# Patient Record
Sex: MALE | Race: ASIAN | NOT HISPANIC OR LATINO | URBAN - METROPOLITAN AREA
[De-identification: names, ages, dates, MRNs, and addresses within clinical notes are randomized per-mention and may not be internally consistent; named-entity substitution may affect disease eponyms.]

---

## 2022-03-23 ENCOUNTER — INPATIENT (INPATIENT)
Facility: HOSPITAL | Age: 67
LOS: 0 days | Discharge: ANOTHER HOSPITAL | DRG: 563 | End: 2022-03-24
Attending: INTERNAL MEDICINE | Admitting: INTERNAL MEDICINE
Payer: COMMERCIAL

## 2022-03-23 VITALS
DIASTOLIC BLOOD PRESSURE: 87 MMHG | OXYGEN SATURATION: 97 % | HEIGHT: 67 IN | RESPIRATION RATE: 18 BRPM | HEART RATE: 90 BPM | TEMPERATURE: 98 F | WEIGHT: 125 LBS | SYSTOLIC BLOOD PRESSURE: 161 MMHG

## 2022-03-23 DIAGNOSIS — Z29.9 ENCOUNTER FOR PROPHYLACTIC MEASURES, UNSPECIFIED: ICD-10-CM

## 2022-03-23 DIAGNOSIS — S92.002A UNSPECIFIED FRACTURE OF LEFT CALCANEUS, INITIAL ENCOUNTER FOR CLOSED FRACTURE: ICD-10-CM

## 2022-03-23 DIAGNOSIS — R94.31 ABNORMAL ELECTROCARDIOGRAM [ECG] [EKG]: ICD-10-CM

## 2022-03-23 DIAGNOSIS — I10 ESSENTIAL (PRIMARY) HYPERTENSION: ICD-10-CM

## 2022-03-23 DIAGNOSIS — D72.829 ELEVATED WHITE BLOOD CELL COUNT, UNSPECIFIED: ICD-10-CM

## 2022-03-23 DIAGNOSIS — E78.5 HYPERLIPIDEMIA, UNSPECIFIED: ICD-10-CM

## 2022-03-23 LAB
ALBUMIN SERPL ELPH-MCNC: 3.6 G/DL — SIGNIFICANT CHANGE UP (ref 3.3–5)
ALP SERPL-CCNC: 110 U/L — SIGNIFICANT CHANGE UP (ref 40–120)
ALT FLD-CCNC: 28 U/L — SIGNIFICANT CHANGE UP (ref 12–78)
ANION GAP SERPL CALC-SCNC: 6 MMOL/L — SIGNIFICANT CHANGE UP (ref 5–17)
APTT BLD: 33.9 SEC — SIGNIFICANT CHANGE UP (ref 27.5–35.5)
AST SERPL-CCNC: 25 U/L — SIGNIFICANT CHANGE UP (ref 15–37)
BASOPHILS # BLD AUTO: 0.06 K/UL — SIGNIFICANT CHANGE UP (ref 0–0.2)
BASOPHILS NFR BLD AUTO: 0.5 % — SIGNIFICANT CHANGE UP (ref 0–2)
BILIRUB SERPL-MCNC: 0.6 MG/DL — SIGNIFICANT CHANGE UP (ref 0.2–1.2)
BUN SERPL-MCNC: 18 MG/DL — SIGNIFICANT CHANGE UP (ref 7–23)
CALCIUM SERPL-MCNC: 9 MG/DL — SIGNIFICANT CHANGE UP (ref 8.5–10.1)
CHLORIDE SERPL-SCNC: 106 MMOL/L — SIGNIFICANT CHANGE UP (ref 96–108)
CO2 SERPL-SCNC: 27 MMOL/L — SIGNIFICANT CHANGE UP (ref 22–31)
CREAT SERPL-MCNC: 0.97 MG/DL — SIGNIFICANT CHANGE UP (ref 0.5–1.3)
EGFR: 87 ML/MIN/1.73M2 — SIGNIFICANT CHANGE UP
EOSINOPHIL # BLD AUTO: 0.27 K/UL — SIGNIFICANT CHANGE UP (ref 0–0.5)
EOSINOPHIL NFR BLD AUTO: 2.4 % — SIGNIFICANT CHANGE UP (ref 0–6)
GLUCOSE SERPL-MCNC: 101 MG/DL — HIGH (ref 70–99)
HCT VFR BLD CALC: 41 % — SIGNIFICANT CHANGE UP (ref 39–50)
HGB BLD-MCNC: 14.2 G/DL — SIGNIFICANT CHANGE UP (ref 13–17)
IMM GRANULOCYTES NFR BLD AUTO: 0.4 % — SIGNIFICANT CHANGE UP (ref 0–1.5)
INR BLD: 1.08 RATIO — SIGNIFICANT CHANGE UP (ref 0.88–1.16)
LYMPHOCYTES # BLD AUTO: 2.42 K/UL — SIGNIFICANT CHANGE UP (ref 1–3.3)
LYMPHOCYTES # BLD AUTO: 21.5 % — SIGNIFICANT CHANGE UP (ref 13–44)
MCHC RBC-ENTMCNC: 29.3 PG — SIGNIFICANT CHANGE UP (ref 27–34)
MCHC RBC-ENTMCNC: 34.6 GM/DL — SIGNIFICANT CHANGE UP (ref 32–36)
MCV RBC AUTO: 84.5 FL — SIGNIFICANT CHANGE UP (ref 80–100)
MONOCYTES # BLD AUTO: 0.94 K/UL — HIGH (ref 0–0.9)
MONOCYTES NFR BLD AUTO: 8.3 % — SIGNIFICANT CHANGE UP (ref 2–14)
NEUTROPHILS # BLD AUTO: 7.55 K/UL — HIGH (ref 1.8–7.4)
NEUTROPHILS NFR BLD AUTO: 66.9 % — SIGNIFICANT CHANGE UP (ref 43–77)
NRBC # BLD: 0 /100 WBCS — SIGNIFICANT CHANGE UP (ref 0–0)
PLATELET # BLD AUTO: 355 K/UL — SIGNIFICANT CHANGE UP (ref 150–400)
POTASSIUM SERPL-MCNC: 3.9 MMOL/L — SIGNIFICANT CHANGE UP (ref 3.5–5.3)
POTASSIUM SERPL-SCNC: 3.9 MMOL/L — SIGNIFICANT CHANGE UP (ref 3.5–5.3)
PROT SERPL-MCNC: 7.7 G/DL — SIGNIFICANT CHANGE UP (ref 6–8.3)
PROTHROM AB SERPL-ACNC: 12.6 SEC — SIGNIFICANT CHANGE UP (ref 10.5–13.4)
RBC # BLD: 4.85 M/UL — SIGNIFICANT CHANGE UP (ref 4.2–5.8)
RBC # FLD: 13.8 % — SIGNIFICANT CHANGE UP (ref 10.3–14.5)
SARS-COV-2 RNA SPEC QL NAA+PROBE: SIGNIFICANT CHANGE UP
SODIUM SERPL-SCNC: 139 MMOL/L — SIGNIFICANT CHANGE UP (ref 135–145)
WBC # BLD: 11.28 K/UL — HIGH (ref 3.8–10.5)
WBC # FLD AUTO: 11.28 K/UL — HIGH (ref 3.8–10.5)

## 2022-03-23 PROCEDURE — 73700 CT LOWER EXTREMITY W/O DYE: CPT | Mod: 26,LT,MA

## 2022-03-23 PROCEDURE — 73590 X-RAY EXAM OF LOWER LEG: CPT | Mod: 26,LT

## 2022-03-23 PROCEDURE — 99285 EMERGENCY DEPT VISIT HI MDM: CPT

## 2022-03-23 PROCEDURE — 72100 X-RAY EXAM L-S SPINE 2/3 VWS: CPT | Mod: 26

## 2022-03-23 PROCEDURE — 73610 X-RAY EXAM OF ANKLE: CPT | Mod: 26,LT

## 2022-03-23 PROCEDURE — 93010 ELECTROCARDIOGRAM REPORT: CPT

## 2022-03-23 PROCEDURE — 71045 X-RAY EXAM CHEST 1 VIEW: CPT | Mod: 26

## 2022-03-23 PROCEDURE — 73630 X-RAY EXAM OF FOOT: CPT | Mod: 26,LT

## 2022-03-23 PROCEDURE — 76376 3D RENDER W/INTRP POSTPROCES: CPT | Mod: 26

## 2022-03-23 RX ORDER — AMLODIPINE BESYLATE 2.5 MG/1
5 TABLET ORAL DAILY
Refills: 0 | Status: DISCONTINUED | OUTPATIENT
Start: 2022-03-23 | End: 2022-03-23

## 2022-03-23 RX ORDER — TRAMADOL HYDROCHLORIDE 50 MG/1
25 TABLET ORAL EVERY 6 HOURS
Refills: 0 | Status: DISCONTINUED | OUTPATIENT
Start: 2022-03-23 | End: 2022-03-24

## 2022-03-23 RX ORDER — SODIUM CHLORIDE 9 MG/ML
1000 INJECTION, SOLUTION INTRAVENOUS
Refills: 0 | Status: DISCONTINUED | OUTPATIENT
Start: 2022-03-24 | End: 2022-03-24

## 2022-03-23 RX ORDER — METOPROLOL TARTRATE 50 MG
50 TABLET ORAL DAILY
Refills: 0 | Status: DISCONTINUED | OUTPATIENT
Start: 2022-03-23 | End: 2022-03-24

## 2022-03-23 RX ORDER — LOSARTAN POTASSIUM 100 MG/1
100 TABLET, FILM COATED ORAL DAILY
Refills: 0 | Status: DISCONTINUED | OUTPATIENT
Start: 2022-03-23 | End: 2022-03-24

## 2022-03-23 RX ORDER — ASPIRIN/CALCIUM CARB/MAGNESIUM 324 MG
81 TABLET ORAL DAILY
Refills: 0 | Status: DISCONTINUED | OUTPATIENT
Start: 2022-03-23 | End: 2022-03-23

## 2022-03-23 RX ORDER — ATORVASTATIN CALCIUM 80 MG/1
20 TABLET, FILM COATED ORAL AT BEDTIME
Refills: 0 | Status: DISCONTINUED | OUTPATIENT
Start: 2022-03-23 | End: 2022-03-24

## 2022-03-23 RX ORDER — AMLODIPINE BESYLATE 2.5 MG/1
5 TABLET ORAL DAILY
Refills: 0 | Status: DISCONTINUED | OUTPATIENT
Start: 2022-03-23 | End: 2022-03-24

## 2022-03-23 RX ORDER — ACETAMINOPHEN 500 MG
650 TABLET ORAL EVERY 6 HOURS
Refills: 0 | Status: DISCONTINUED | OUTPATIENT
Start: 2022-03-23 | End: 2022-03-24

## 2022-03-23 RX ORDER — LANOLIN ALCOHOL/MO/W.PET/CERES
3 CREAM (GRAM) TOPICAL AT BEDTIME
Refills: 0 | Status: DISCONTINUED | OUTPATIENT
Start: 2022-03-23 | End: 2022-03-24

## 2022-03-23 RX ORDER — ONDANSETRON 8 MG/1
4 TABLET, FILM COATED ORAL EVERY 8 HOURS
Refills: 0 | Status: DISCONTINUED | OUTPATIENT
Start: 2022-03-23 | End: 2022-03-24

## 2022-03-23 RX ORDER — TRAMADOL HYDROCHLORIDE 50 MG/1
50 TABLET ORAL EVERY 6 HOURS
Refills: 0 | Status: DISCONTINUED | OUTPATIENT
Start: 2022-03-23 | End: 2022-03-24

## 2022-03-23 RX ADMIN — ATORVASTATIN CALCIUM 20 MILLIGRAM(S): 80 TABLET, FILM COATED ORAL at 21:21

## 2022-03-23 NOTE — H&P ADULT - EXTREMITIES COMMENTS
Left foot w/ mild non-pitting edema, mildly erythematous when compared to right Left foot w/ mild non-pitting edema, mildly erythematous when compared to right foot

## 2022-03-23 NOTE — H&P ADULT - NSHPSOCIALHISTORY_GEN_ALL_CORE
lives with   etoh  tobacco  drugs  ambulates  adls lives with wife and two sons  etoh- occasionally   tobacco- current everyday smoker, smokes a pack per day, 40 pack year history   drugs - none  ambulates - independently before injury, now ambulates w/ crutches   adls independent

## 2022-03-23 NOTE — H&P ADULT - PROBLEM SELECTOR PLAN 2
BP acceptable on admission, mildly elevated likely 2/2 to pain   continue acute, likely 2/2 to inflammation/stress associated w/ calcaneal fracture   - admission WBC 11.28   - patient without fever or symptoms of infection   - will monitor off abx for now   - trend daily CBCs Pt with abnormal EKG  -? cardiac history ? Unclear History of CAD  -On Tele  Cardiology [facility]  On ASA,  Statin, BB Pt with abnormal EKG  -? cardiac history ? Unclear History of CAD  -On Tele  Cardiology DR [facility]  On ASA,  Statin, BB,  ECHO-as per Cardio Pt with abnormal EKG  -? cardiac history ? MI  ? Unclear History of CAD  -On Tele  Cardiology DR [facility]  On ASA,  Statin, BB,  ECHO-as per Cardio

## 2022-03-23 NOTE — H&P ADULT - PROBLEM SELECTOR PLAN 1
- CT Left foot: Severely comminuted calcaneal fracture with significant impaction s/p known fall   - pain control with:   - podiatry consulted, plan for left calceneal fracture repair  - cardio aleja consulted for clearance - CT Left foot: Severely comminuted calcaneal fracture with significant impaction s/p known fall   - pain control with:   - podiatry consulted, plan for left calcaneal fracture repair  - cardio aleja consulted for clearance  - NPO after midnight  - will hold anticoagulants for now, reached out to podiatry to confirm time of procedure - CT Left foot: Severely comminuted calcaneal fracture with significant impaction s/p known fall   - pain control with: tylenol 650mg q6H PRN  - podiatry consulted, plan for left calcaneal fracture repair tomorrow, NPO after midnight   - cardio aleja consulted for clearance  - will hold patients home ASA 81 for now - CT Left foot: Severely comminuted calcaneal fracture with significant impaction s/p known fall   - pain control with: tylenol 650mg q6H PRN  - podiatry consulted, plan for left calcaneal fracture repair tomorrow  - NPO after midnight, IVF NS + D5 @ 60 cc/hr after NPO   - Pain regimen: tylenol 650mg for mild, ultram 25mg q6H for moderate, ultram 50mg q6H for severe   - cardio aleja consulted for clearance  - will hold patients home ASA 81 for now - CT Left foot: Severely comminuted calcaneal fracture with significant impaction s/p known fall   - pain control with: tylenols 650mg q6H PRN  - podiatry consulted, plan for left calcaneal fracture repair tomorrow  - NPO after midnight, IVF NS + D5 @ 60 cc/hr after NPO   - Pain regimen: Tylenol 650mg for mild, ultram 25mg q6H for moderate, ultram 50mg q6H for severe   - cardio aleja consulted for clearance  - will hold patients home ASA 81 for now

## 2022-03-23 NOTE — ED ADULT NURSE NOTE - OBJECTIVE STATEMENT
Patient A/Ox4. Son at bedside to translate per patient preference. Patient fell off "4-5 steps" of a ladder and landed on his heel 1 week ago. Patient did not seek any medical care at that time. Today, he c/o left ankle pain, ecchymosis and swelling. + pulse. Denies head strike. Patient declined ice pack at this time. Will continue to monitor. Patient A/Ox4. Son at bedside to translate per patient preference. Patient is Mandarin speaking. Patient fell off "4-5 steps" of a ladder and landed on his heel 1 week ago. Patient did not seek any medical care at that time. Today, he c/o left ankle pain, ecchymosis and swelling. + pulse. Denies head strike. Patient declined ice pack at this time. Will continue to monitor.

## 2022-03-23 NOTE — CONSULT NOTE ADULT - PROBLEM SELECTOR RECOMMENDATION 9
INCOMPLETE    Pt seen and evaluated.  XR results reviewed- left calcaneal joint-depression type fracture.  Pending calcaneal axial XR and CT to assess degree of comminution.  Pending lumbar XR to r/o fracture.  ~  Elevate and ice left lower extremity.  Non-weightbearing to the left lower extremity with crutches.  Keep splint clean, dry and intact until follow-up.  Follow up with Dr. Avila ________________. normal... INCOMPLETE    Pt seen and evaluated.  XR results reviewed- left calcaneal joint-depression type fracture.  Pending calcaneal axial XR and CT to assess degree of comminution and for possible surgical planning.  Pending lumbar XR to r/o fracture.  ~  Elevate and ice left lower extremity.  Non-weightbearing to the left lower extremity with crutches.  Keep splint clean, dry and intact until follow-up.  Follow up with Dr. Mitchell next Friday 4/1/22. INCOMPLETE    Pt seen and evaluated.  XR results reviewed- left calcaneal joint-depression type fracture.  Pending calcaneal axial XR and CT to assess degree of comminution and for possible surgical planning.  Pending lumbar XR to r/o fracture. Pt seen and evaluated.  Left calcaneal comminuted fracture.  XR and CT imaging results reviewed.  Plan for left calcaneus ORIF tomorrow 3/24/22 as an add on.  Requesting medical risk stratification and optimization  Requesting cardiology risk stratification and optimization  Please obtain pre-op labs (CBC, BMP, type & screen, coags on AM of surgery)  Please obtain chest XR (2 views) and EKG  Please keep pt NPO at midnight prior to surgery and start IV fluids. Pt seen and evaluated.  XR and CT imaging results reviewed.  Left calcaneal comminuted fracture.  Discussed both conservative and surgical options with the pt, as well as all risks and benefits.  Conservative intervention would consist of 8-12 weeks of non-weightbearing in a splint or cast, however due to the extent of comminution and subtalar joint involvement, there is high likelihood of post-traumatic arthritis, continued pain and foot deformity.  Surgical intervention would consist of open reduction and internal fixation most likely using a plate and screws, with the possibility of subtalar joint fusion depending on how the fracture looks intra-op. Post-op course consists of approx 8 weeks of non-weightbearing depending on serial x-rays and clinical course. Transition to non-weightbearing in a CAM boot, and then gradual transition to weight bearing.  Given the patient's severity of symptoms, will plan for surgical intervention at this time. Discussed risks, benefits, and potential complications with patient and family members regarding surgical intervention including sepsis, loss of limb, and loss of life. All questions answered to satisfaction at this time.  ~  Plan for left calcaneus ORIF tomorrow 3/24/22 as an add on.  Requesting medical risk stratification and optimization  Requesting cardiology risk stratification and optimization  Please obtain pre-op labs (CBC, BMP, type & screen, coags on AM of surgery)  Please obtain chest XR (2 views) and EKG  Please keep pt NPO at midnight prior to surgery and start IV fluids.  Pt will be followed by Podiatry while in house. Pt seen and evaluated.  XR and CT imaging results reviewed.  Left calcaneal comminuted fracture.  Discussed both conservative and surgical options with the pt, as well as all risks and benefits.  Conservative intervention would consist of 8-12 weeks of non-weightbearing in a splint or cast, however due to the extent of comminution and subtalar joint involvement, there is high likelihood of post-traumatic arthritis, continued pain and foot deformity.  Surgical intervention would consist of open reduction and internal fixation most likely using a plate and screws, with the possibility of subtalar joint fusion depending on how the fracture looks intra-op. Post-op course consists of approx 8 weeks of non-weightbearing depending on serial x-rays and clinical course. Transition to non-weightbearing in a CAM boot, and then gradual transition to weight bearing.  Pt understands that if ORIF if performed, there is a possibility that he would require revisional surgery in the future.  Given the patient's severity of symptoms, will plan for surgical intervention at this time. Discussed risks, benefits, and potential complications with patient and family members regarding surgical intervention including sepsis, loss of limb, and loss of life. All questions answered to satisfaction at this time.  ~  Plan for left calcaneus ORIF tomorrow 3/24/22 as an add on.  Requesting medical risk stratification and optimization  Requesting cardiology risk stratification and optimization  Please obtain pre-op labs (CBC, BMP, type & screen, coags on AM of surgery)  Please obtain chest XR (2 views) and EKG  Please keep pt NPO at midnight prior to surgery and start IV fluids.  Pt will be followed by Podiatry while in house. Pt seen and evaluated.  XR and CT imaging results reviewed.  Left calcaneal comminuted fracture.  Discussed both conservative and surgical options with the pt, as well as all risks and benefits.  Conservative intervention would consist of 8-12 weeks of non-weightbearing in a splint or cast, however due to the extent of comminution and subtalar joint involvement, there is high likelihood of post-traumatic arthritis, continued pain and foot deformity.  Surgical intervention would consist of open reduction and internal fixation most likely using a plate and screws. Post-op course consists of approx 8 weeks of non-weightbearing depending on serial x-rays and clinical course. Transition to non-weightbearing in a CAM boot, and then gradual transition to weight bearing.  Pt understands that if ORIF if performed, there is a possibility that he would require revisional surgery in the future.  Instructed pt on smoking cessation as it can lead to delayed healing and wound breakdown.  Given the patient's severity of symptoms, will plan for surgical intervention at this time. Discussed risks, benefits, and potential complications with patient and family members regarding surgical intervention including sepsis, loss of limb, and loss of life. All questions answered to satisfaction at this time.  ~  Plan for left calcaneus ORIF tomorrow 3/24/22 as an add on.  Requesting medical risk stratification and optimization  Requesting cardiology risk stratification and optimization  Please obtain pre-op labs (CBC, BMP, type & screen, coags on AM of surgery)  Please obtain chest XR (2 views) and EKG  Please keep pt NPO at midnight prior to surgery and start IV fluids.  Pt will be followed by Podiatry while in house.

## 2022-03-23 NOTE — H&P ADULT - PROBLEM SELECTOR PLAN 4
Chronic, stable   continue home atorvastatin 20mg qD Chronic, BP acceptable on admission, mildly elevated likely 2/2 to pain   continue home irbesartan therapeutic interchange losartan 100mg qD  continue home metoprolol succinate 50mg qD  routine hemodynamic monitoring

## 2022-03-23 NOTE — PATIENT PROFILE ADULT - FALL HARM RISK - HARM RISK INTERVENTIONS

## 2022-03-23 NOTE — H&P ADULT - ATTENDING COMMENTS
65 yo M PMHx HTN, HLD, PVCs presenting to ED w/ left foot pain s/p mechanical fall 1 week ago, admitted for left calcaneal fracture repair.   pt seen, examined, case & care plan d/w pt, residents at detail.  D/W Son at detail.  AM labs   NPO after midnight for Possible Left calcaneal fracture repair.  Pain meds PRN. NON weight bearing Rt foot.  -Pre Op IV Antibiotics as per Podiatry.  Post op DVT prophylaxis. 65 yo M PMHx HTN, HLD, PVCs presenting to ED w/ left foot pain s/p mechanical fall 1 week ago, admitted for left calcaneal fracture repair.   pt seen, examined, case & care plan d/w pt, residents at detail.  D/W Son at detail.  AM labs   NPO after midnight for Possible Left calcaneal fracture repair.  Pain meds PRN. NON weight bearing left foot.  -Pre Op IV Antibiotics as per Podiatry.  Post op DVT prophylaxis.  Cardiology Eval for abnormal EKG & Pre Op Eval.  D/W pt & Son at detail.

## 2022-03-23 NOTE — H&P ADULT - PROBLEM SELECTOR PLAN 6
Linear
will place SCD's pending possible procedure  start chemical prophylaxis when surgically appropriate

## 2022-03-23 NOTE — ED PROVIDER NOTE - PHYSICAL EXAMINATION
ms left le:+ ecchymosis left ankle with diffuse swelling diffusely ttp. calcaneous and tarsal bones ttp. digits nontender decreased rom sensation intact NVI + 2 DP

## 2022-03-23 NOTE — H&P ADULT - PROBLEM SELECTOR PLAN 5
will place SCD's pending possible procedure  start chemical prophylaxis when surgically appropriate Chronic, stable   continue home atorvastatin 20mg qD

## 2022-03-23 NOTE — ED PROVIDER NOTE - CLINICAL SUMMARY MEDICAL DECISION MAKING FREE TEXT BOX
Pt presenting to the ED with left foot/ankle pain and significant swelling s/p fall from ladder. NVI at this time and despite swelling compartments are soft. High suspicion for fracture. Will obtain screening x-rays. Pt refusing pain medication

## 2022-03-23 NOTE — ED PROVIDER NOTE - NSICDXPASTMEDICALHX_GEN_ALL_CORE_FT
PAST MEDICAL HISTORY:  HTN (hypertension)      PAST MEDICAL HISTORY:  HLD (hyperlipidemia)     HTN (hypertension)     PVC (premature ventricular contraction)

## 2022-03-23 NOTE — H&P ADULT - MUSCULOSKELETAL
normal/no joint swelling/no calf tenderness/normal strength detailed exam details… normal/no joint swelling/no calf tenderness/normal strength/decreased ROM/decreased ROM due to pain/joint swelling/joint erythema

## 2022-03-23 NOTE — H&P ADULT - NSICDXPASTMEDICALHX_GEN_ALL_CORE_FT
PAST MEDICAL HISTORY:  HLD (hyperlipidemia)     HTN (hypertension)     PVC (premature ventricular contraction)

## 2022-03-23 NOTE — H&P ADULT - NEGATIVE OPHTHALMOLOGIC SYMPTOMS
no blurred vision L/no blurred vision R/no discharge L/no discharge R/no pain L/no pain R/no scleral injection L/no scleral injection R

## 2022-03-23 NOTE — CONSULT NOTE ADULT - SUBJECTIVE AND OBJECTIVE BOX
HPI:  66 year old Male with PMHx of hypertension seen at \[facility]\"" ED for left foot injury. Pt fell from a ladder about 2ft off the ground 1 week ago (DOI: 3/16/22). They applied ice to the area, which helped reduce the swelling, however he states that even though the pain has decreased, it has not completely gone away. Presented to the ED as the symptoms did not improve and he has been unable to bear any weight on the foot. Pain is a 3/10 at this time. Did not seek medical attention at the time of injury. Denies any fever, chills, nausea, vomiting, chest pain, shortness of breath, or calf pain at this time.    PAST MEDICAL HISTORY: Hypertension      Allergies    No Known Allergies    Intolerances      Social History:      Vital Signs Last 24 Hrs  T(C): 36.6 (23 Mar 2022 12:44), Max: 36.6 (23 Mar 2022 12:44)  T(F): 97.9 (23 Mar 2022 12:44), Max: 97.9 (23 Mar 2022 12:44)  HR: 90 (23 Mar 2022 12:44) (90 - 90)  BP: 161/87 (23 Mar 2022 12:44) (161/87 - 161/87)  BP(mean): --  RR: 18 (23 Mar 2022 12:44) (18 - 18)  SpO2: 97% (23 Mar 2022 12:44) (97% - 97%)    PHYSICAL EXAM:  Vascular: DP & PT palpable bilaterally, Capillary refill 3 seconds, Digital hair present bilaterally, left medial and lateral heel border ecchymosis, left foot non-pitting edema.  Neurological: Light touch sensation intact bilaterally.  Musculoskeletal: AJ & STJ ROM intact, pain on palpation to anterior lateral ankle, no pain on lateral compression of the left heel.  Dermatological: No open wounds or lesions b/l. No fracture blisters.   HPI:  66 year old Male with PMHx of hypertension seen at hospitals ED for left foot injury. Pt fell from a ladder about 2ft off the ground 1 week ago (DOI: 3/16/22). They applied ice to the area, which helped reduce the swelling, however he states that even though the pain has decreased, it has not completely gone away. Presented to the ED as the symptoms did not improve and he has been unable to bear any weight on the foot. Pain is a 3/10 at this time. Did not seek medical attention at the time of injury. Denies any fever, chills, nausea, vomiting, chest pain, shortness of breath, or calf pain at this time.    PAST MEDICAL HISTORY:  HLD (hyperlipidemia)     HTN (hypertension)     PVC (premature ventricular contraction).     PAST SURGICAL HISTORY:  No significant past surgical history.     FAMILY HISTORY:  Father  Still living? Unknown  FH: HTN (hypertension), Age at diagnosis: Age Unknown.    Allergies    No Known Allergies    Intolerances      Social History: lives with wife and two sons  etoh- occasionally   tobacco- current everyday smoker, smokes a pack per day, 40 pack year history   drugs - none  ambulates - independently before injury, now ambulates w/ crutches   adls independent      Vital Signs Last 24 Hrs  T(C): 36.6 (23 Mar 2022 12:44), Max: 36.6 (23 Mar 2022 12:44)  T(F): 97.9 (23 Mar 2022 12:44), Max: 97.9 (23 Mar 2022 12:44)  HR: 90 (23 Mar 2022 12:44) (90 - 90)  BP: 161/87 (23 Mar 2022 12:44) (161/87 - 161/87)  BP(mean): --  RR: 18 (23 Mar 2022 12:44) (18 - 18)  SpO2: 97% (23 Mar 2022 12:44) (97% - 97%)    PHYSICAL EXAM:  Vascular: DP & PT palpable bilaterally, Capillary refill 3 seconds, Digital hair present bilaterally, left medial and lateral heel border ecchymosis, left foot non-pitting edema.  Neurological: Light touch sensation intact bilaterally.  Musculoskeletal: AJ & STJ ROM intact, pain on palpation to anterior lateral ankle, no pain on lateral compression of the left heel.  Dermatological: No open wounds or lesions b/l. No fracture blisters.

## 2022-03-23 NOTE — ED PROVIDER NOTE - OBJECTIVE STATEMENT
pt Is a 67yo male with pmhx of htn, "heart issues" on AC presents with left ankle/foot pain. pt reports he fell a few days ago without head injury. pt reports swelling and deformity to left ankle. pt has been using crutches to assist in ambulating. pt did not take anything for pain. pt denies numbness, head injury, loc. son awais used as  per pt request. offered interpretation services.

## 2022-03-23 NOTE — H&P ADULT - ASSESSMENT
67 yo M PMHx HTN admitted for left calceneal fracture repair.  65 yo M PMHx HTN, HLD, PVCs presenting to ED w/ left foot pain s/p mechanical fall 1 week ago, admitted for left calceneal fracture repair.

## 2022-03-23 NOTE — H&P ADULT - PROBLEM SELECTOR PLAN 3
will place SCD's pending possible procedure  start chemical prophylaxis when surgically appropiate Chronic, BP acceptable on admission, mildly elevated likely 2/2 to pain   continue home irbesartan therapeutic interchange losartan 100mg qD  continue home metoprolol succinate 50mg qD  routine hemodynamic monitoring acute, reactive , likely 2/2 to inflammation/stress associated w/ left calcaneal fracture   - admission WBC 11.28   - patient without fever or symptoms of infection   - will monitor off abx for now   - trend daily CBCs

## 2022-03-23 NOTE — ED PROVIDER NOTE - ATTENDING CONTRIBUTION TO CARE
pt Is a 65yo male with pmhx of htn, "heart issues" on AC presents with left ankle/foot pain. pt reports he fell a few days ago without head injury. pt reports swelling and deformity to left ankle. pt has been using crutches to assist in ambulating. pt did not take anything for pain. pt denies numbness, head injury, loc. son awais used as  per pt request. offered interpretation services. Pt is a 67 yo male who presents to the ED with a cc of left foot pain. PMHx of HTN. Pt reports that appro 1 week ao he feel from a ladder landing on his left foot. Pt reports that the fall was approx 2 feet. Denies head injury. Reports pain to his left heel with worsening swelling to the left foot and inability to bear weight to th left foot. Symptoms have worsened and so pt came to the ED for further work up. Denies fever, chills, N/V. CP, SOB, abd pain. Pt has not taken anything for pain. On exam pt sitting in bed NAD, heart RR, lungs CTA, abd soft NT/ND. Left foot: diffuse swelling to left foot/ankle with TTP overlying the calcaneus. Significant swelling noted to left foot/ankle. Sensation grossly intact cap refill less then 2 seconds +pedal pulse. No TTP to left hip, femur, knee, tib/fib. No midline TTP to C/T/L

## 2022-03-23 NOTE — ED PROVIDER NOTE - NS ED ATTENDING STATEMENT MOD
This was a shared visit with the [facility]. I reviewed and verified the documentation and independently performed the documented:

## 2022-03-23 NOTE — ED PROVIDER NOTE - PROGRESS NOTE DETAILS
podiatrist advised admission and surgery pt deciding pending dispo podiatrist consulted pending eval pt refuses pain control still pending dispo pt waiting for podiatrist in ed for re-eval pt still refuses pain control will admit to dr celeste

## 2022-03-23 NOTE — H&P ADULT - HISTORY OF PRESENT ILLNESS
67 yo M PMHx HTN    here for fall.           ER Course:   Vitals: 161/87 HR 90/minute RR 18/minute T 97.9 F 97% RA   Labs: insignificant   EKG:   Imaging:   CT Left Foot: Severely comminuted calcaneal fracture with significant impaction and comminution of the central to lateral margin of the posterior articular   facet. There is slightly displaced calcaneal fracture involvement of the   calcaneocuboid joint and nondisplaced fracture involvement of the middle   calcaneal facet.  X ray Left Foot: Comminuted, minimally displaced calcaneal fracture, CT ordered   65 yo M PMHx HTN, HLD, occasional PVCs presented to the ED complaining of left ankle pain. Patient is mandarin speaking, refused telephone  services, preferred to be communicated w/ using son Yair Rodarte as . Patient was doing house work on a ladder 1 week ago when the ladder slipped and patient fell about 2-4 feet. Patient landed on his feet and sat down immediately after, denied any head strike, or loss of consciousness. Patient states since that fall, his left ankle/foot has been in pain, forcing him to ambulate using crutches to prevent bearing weight. Patient has been just treating the pain with chinese medicines and aspirin at home, but states he wanted to come to the ED when the pain did not resolve after a week. Patient seen and examined in the ED, denies any headache/lightheadedness/dizziness, CP, chest pressure, palpitations, abd pain, nausea/vomitting, diarrhea/constipation, fevers/chills. Patient only endorses left ankle/foot pain, achey in nature 3/10 non-radiating.     ER Course:   Vitals: 161/87 HR 90/minute RR 18/minute T 97.9 F 97% RA   Labs: insignificant   EKG:   Imaging:   CT Left Foot: Severely comminuted calcaneal fracture with significant impaction and comminution of the central to lateral margin of the posterior articular   facet. There is slightly displaced calcaneal fracture involvement of the   calcaneocuboid joint and nondisplaced fracture involvement of the middle   calcaneal facet.  X ray Left Foot: Comminuted, minimally displaced calcaneal fracture, CT ordered   67 yo M PMHx HTN, HLD, occasional PVCs presented to the ED complaining of left ankle pain. Patient is mandarin speaking, refused telephone  services, preferred to be communicated w/ using son Yair Rodarte as . Patient was doing house work on a ladder 1 week ago when the ladder slipped and patient fell about 2-4 feet. Patient landed on his feet and sat down immediately after, denied any head strike, or loss of consciousness. Patient states since that fall, his left ankle/foot has been in pain, forcing him to ambulate using crutches to prevent bearing weight. Patient has been just treating the pain with chinese medicines and aspirin at home, but states he wanted to come to the ED when the pain did not resolve after a week. Patient seen and examined in the ED, denies any headache/lightheadedness/dizziness, CP, chest pressure, palpitations, abd pain, nausea/vomitting, diarrhea/constipation, fevers/chills. Patient only endorses left ankle/foot pain, achey in nature 3/10 non-radiating.     ER Course:   Vitals: 161/87 HR 90/minute RR 18/minute T 97.9 F 97% RA   Labs: insignificant   EKG: NSR @ 81bpm, normal axis, LVH, w/ flipped t-waves in III, AVF  Imaging:   CT Left Foot: Severely comminuted calcaneal fracture with significant impaction and comminution of the central to lateral margin of the posterior articular   facet. There is slightly displaced calcaneal fracture involvement of the   calcaneocuboid joint and nondisplaced fracture involvement of the middle   calcaneal facet.  X ray Left Foot: Comminuted, minimally displaced calcaneal fracture, CT ordered

## 2022-03-23 NOTE — H&P ADULT - NEGATIVE MUSCULOSKELETAL SYMPTOMS
no myalgia/no muscle cramps/no muscle weakness/no leg pain R no myalgia/no muscle cramps/no muscle weakness/no neck pain/no leg pain R

## 2022-03-23 NOTE — H&P ADULT - GASTROINTESTINAL DETAILS
normal/soft/nontender/no distention/no masses palpable/bowel sounds normal normal/soft/nontender/no distention/no masses palpable/bowel sounds normal/no bruit/no rebound tenderness/no guarding/no rigidity/no organomegaly

## 2022-03-24 ENCOUNTER — TRANSCRIPTION ENCOUNTER (OUTPATIENT)
Age: 67
End: 2022-03-24

## 2022-03-24 ENCOUNTER — INPATIENT (INPATIENT)
Facility: HOSPITAL | Age: 67
LOS: 0 days | Discharge: HOME HEALTH | End: 2022-03-25
Attending: INTERNAL MEDICINE | Admitting: INTERNAL MEDICINE
Payer: MEDICARE

## 2022-03-24 VITALS
RESPIRATION RATE: 16 BRPM | HEART RATE: 69 BPM | DIASTOLIC BLOOD PRESSURE: 66 MMHG | SYSTOLIC BLOOD PRESSURE: 121 MMHG | TEMPERATURE: 98 F | OXYGEN SATURATION: 93 %

## 2022-03-24 VITALS
HEIGHT: 67 IN | HEART RATE: 81 BPM | WEIGHT: 147.05 LBS | TEMPERATURE: 98 F | SYSTOLIC BLOOD PRESSURE: 108 MMHG | DIASTOLIC BLOOD PRESSURE: 61 MMHG | RESPIRATION RATE: 17 BRPM | OXYGEN SATURATION: 98 %

## 2022-03-24 DIAGNOSIS — R07.9 CHEST PAIN, UNSPECIFIED: ICD-10-CM

## 2022-03-24 LAB
ALBUMIN SERPL ELPH-MCNC: 3.2 G/DL — LOW (ref 3.3–5)
ALP SERPL-CCNC: 99 U/L — SIGNIFICANT CHANGE UP (ref 40–120)
ALT FLD-CCNC: 25 U/L — SIGNIFICANT CHANGE UP (ref 12–78)
ANION GAP SERPL CALC-SCNC: 6 MMOL/L — SIGNIFICANT CHANGE UP (ref 5–17)
APTT BLD: 32.2 SEC — SIGNIFICANT CHANGE UP (ref 27.5–35.5)
AST SERPL-CCNC: 19 U/L — SIGNIFICANT CHANGE UP (ref 15–37)
BASOPHILS # BLD AUTO: 0.06 K/UL — SIGNIFICANT CHANGE UP (ref 0–0.2)
BASOPHILS NFR BLD AUTO: 0.6 % — SIGNIFICANT CHANGE UP (ref 0–2)
BILIRUB SERPL-MCNC: 0.7 MG/DL — SIGNIFICANT CHANGE UP (ref 0.2–1.2)
BUN SERPL-MCNC: 16 MG/DL — SIGNIFICANT CHANGE UP (ref 7–23)
CALCIUM SERPL-MCNC: 8.8 MG/DL — SIGNIFICANT CHANGE UP (ref 8.5–10.1)
CHLORIDE SERPL-SCNC: 108 MMOL/L — SIGNIFICANT CHANGE UP (ref 96–108)
CK MB BLD-MCNC: 0.8 % — SIGNIFICANT CHANGE UP (ref 0–3.5)
CK MB CFR SERPL CALC: 1.4 NG/ML — SIGNIFICANT CHANGE UP (ref 0–3.6)
CK SERPL-CCNC: 178 U/L — SIGNIFICANT CHANGE UP (ref 26–308)
CO2 SERPL-SCNC: 27 MMOL/L — SIGNIFICANT CHANGE UP (ref 22–31)
CREAT SERPL-MCNC: 0.91 MG/DL — SIGNIFICANT CHANGE UP (ref 0.5–1.3)
EGFR: 94 ML/MIN/1.73M2 — SIGNIFICANT CHANGE UP
EOSINOPHIL # BLD AUTO: 0.39 K/UL — SIGNIFICANT CHANGE UP (ref 0–0.5)
EOSINOPHIL NFR BLD AUTO: 3.8 % — SIGNIFICANT CHANGE UP (ref 0–6)
GLUCOSE SERPL-MCNC: 110 MG/DL — HIGH (ref 70–99)
HCT VFR BLD CALC: 39.2 % — SIGNIFICANT CHANGE UP (ref 39–50)
HCV AB S/CO SERPL IA: 0.15 S/CO — SIGNIFICANT CHANGE UP (ref 0–0.99)
HCV AB SERPL-IMP: SIGNIFICANT CHANGE UP
HGB BLD-MCNC: 13.2 G/DL — SIGNIFICANT CHANGE UP (ref 13–17)
IMM GRANULOCYTES NFR BLD AUTO: 0.4 % — SIGNIFICANT CHANGE UP (ref 0–1.5)
INR BLD: 1.11 RATIO — SIGNIFICANT CHANGE UP (ref 0.88–1.16)
LYMPHOCYTES # BLD AUTO: 1.66 K/UL — SIGNIFICANT CHANGE UP (ref 1–3.3)
LYMPHOCYTES # BLD AUTO: 16.3 % — SIGNIFICANT CHANGE UP (ref 13–44)
MCHC RBC-ENTMCNC: 29 PG — SIGNIFICANT CHANGE UP (ref 27–34)
MCHC RBC-ENTMCNC: 33.7 GM/DL — SIGNIFICANT CHANGE UP (ref 32–36)
MCV RBC AUTO: 86.2 FL — SIGNIFICANT CHANGE UP (ref 80–100)
MONOCYTES # BLD AUTO: 0.96 K/UL — HIGH (ref 0–0.9)
MONOCYTES NFR BLD AUTO: 9.4 % — SIGNIFICANT CHANGE UP (ref 2–14)
NEUTROPHILS # BLD AUTO: 7.09 K/UL — SIGNIFICANT CHANGE UP (ref 1.8–7.4)
NEUTROPHILS NFR BLD AUTO: 69.5 % — SIGNIFICANT CHANGE UP (ref 43–77)
NRBC # BLD: 0 /100 WBCS — SIGNIFICANT CHANGE UP (ref 0–0)
PLATELET # BLD AUTO: 347 K/UL — SIGNIFICANT CHANGE UP (ref 150–400)
POTASSIUM SERPL-MCNC: 4.2 MMOL/L — SIGNIFICANT CHANGE UP (ref 3.5–5.3)
POTASSIUM SERPL-SCNC: 4.2 MMOL/L — SIGNIFICANT CHANGE UP (ref 3.5–5.3)
PROT SERPL-MCNC: 7.1 G/DL — SIGNIFICANT CHANGE UP (ref 6–8.3)
PROTHROM AB SERPL-ACNC: 13 SEC — SIGNIFICANT CHANGE UP (ref 10.5–13.4)
RBC # BLD: 4.55 M/UL — SIGNIFICANT CHANGE UP (ref 4.2–5.8)
RBC # FLD: 13.5 % — SIGNIFICANT CHANGE UP (ref 10.3–14.5)
SODIUM SERPL-SCNC: 141 MMOL/L — SIGNIFICANT CHANGE UP (ref 135–145)
TROPONIN I, HIGH SENSITIVITY RESULT: 3015.5 NG/L — HIGH
WBC # BLD: 10.2 K/UL — SIGNIFICANT CHANGE UP (ref 3.8–10.5)
WBC # FLD AUTO: 10.2 K/UL — SIGNIFICANT CHANGE UP (ref 3.8–10.5)

## 2022-03-24 PROCEDURE — 82553 CREATINE MB FRACTION: CPT

## 2022-03-24 PROCEDURE — 72100 X-RAY EXAM L-S SPINE 2/3 VWS: CPT

## 2022-03-24 PROCEDURE — 85730 THROMBOPLASTIN TIME PARTIAL: CPT

## 2022-03-24 PROCEDURE — 85025 COMPLETE CBC W/AUTO DIFF WBC: CPT

## 2022-03-24 PROCEDURE — 86850 RBC ANTIBODY SCREEN: CPT

## 2022-03-24 PROCEDURE — 71045 X-RAY EXAM CHEST 1 VIEW: CPT

## 2022-03-24 PROCEDURE — 99285 EMERGENCY DEPT VISIT HI MDM: CPT

## 2022-03-24 PROCEDURE — 93454 CORONARY ARTERY ANGIO S&I: CPT | Mod: 26,59

## 2022-03-24 PROCEDURE — 73700 CT LOWER EXTREMITY W/O DYE: CPT | Mod: MA

## 2022-03-24 PROCEDURE — 93010 ELECTROCARDIOGRAM REPORT: CPT

## 2022-03-24 PROCEDURE — 73610 X-RAY EXAM OF ANKLE: CPT

## 2022-03-24 PROCEDURE — 85610 PROTHROMBIN TIME: CPT

## 2022-03-24 PROCEDURE — 93306 TTE W/DOPPLER COMPLETE: CPT

## 2022-03-24 PROCEDURE — 80053 COMPREHEN METABOLIC PANEL: CPT

## 2022-03-24 PROCEDURE — 73630 X-RAY EXAM OF FOOT: CPT

## 2022-03-24 PROCEDURE — 73590 X-RAY EXAM OF LOWER LEG: CPT

## 2022-03-24 PROCEDURE — 93306 TTE W/DOPPLER COMPLETE: CPT | Mod: 26

## 2022-03-24 PROCEDURE — 92928 PRQ TCAT PLMT NTRAC ST 1 LES: CPT | Mod: LC

## 2022-03-24 PROCEDURE — 99223 1ST HOSP IP/OBS HIGH 75: CPT

## 2022-03-24 PROCEDURE — 36415 COLL VENOUS BLD VENIPUNCTURE: CPT

## 2022-03-24 PROCEDURE — 87635 SARS-COV-2 COVID-19 AMP PRB: CPT

## 2022-03-24 PROCEDURE — 84484 ASSAY OF TROPONIN QUANT: CPT

## 2022-03-24 PROCEDURE — 86901 BLOOD TYPING SEROLOGIC RH(D): CPT

## 2022-03-24 PROCEDURE — 82550 ASSAY OF CK (CPK): CPT

## 2022-03-24 PROCEDURE — 86803 HEPATITIS C AB TEST: CPT

## 2022-03-24 PROCEDURE — 93005 ELECTROCARDIOGRAM TRACING: CPT

## 2022-03-24 PROCEDURE — 86900 BLOOD TYPING SEROLOGIC ABO: CPT

## 2022-03-24 PROCEDURE — 76376 3D RENDER W/INTRP POSTPROCES: CPT

## 2022-03-24 PROCEDURE — 73650 X-RAY EXAM OF HEEL: CPT

## 2022-03-24 RX ORDER — OMEPRAZOLE 10 MG/1
1 CAPSULE, DELAYED RELEASE ORAL
Qty: 0 | Refills: 0 | DISCHARGE

## 2022-03-24 RX ORDER — TRAMADOL HYDROCHLORIDE 50 MG/1
1 TABLET ORAL
Qty: 0 | Refills: 0 | DISCHARGE
Start: 2022-03-24

## 2022-03-24 RX ORDER — CLOPIDOGREL BISULFATE 75 MG/1
75 TABLET, FILM COATED ORAL DAILY
Refills: 0 | Status: DISCONTINUED | OUTPATIENT
Start: 2022-03-25 | End: 2022-03-25

## 2022-03-24 RX ORDER — LOSARTAN POTASSIUM 100 MG/1
100 TABLET, FILM COATED ORAL DAILY
Refills: 0 | Status: DISCONTINUED | OUTPATIENT
Start: 2022-03-24 | End: 2022-03-25

## 2022-03-24 RX ORDER — SODIUM CHLORIDE 9 MG/ML
1000 INJECTION, SOLUTION INTRAVENOUS
Refills: 0 | Status: DISCONTINUED | OUTPATIENT
Start: 2022-03-24 | End: 2022-03-24

## 2022-03-24 RX ORDER — ASPIRIN/CALCIUM CARB/MAGNESIUM 324 MG
1 TABLET ORAL
Qty: 0 | Refills: 0 | DISCHARGE

## 2022-03-24 RX ORDER — CHOLECALCIFEROL (VITAMIN D3) 125 MCG
2000 CAPSULE ORAL DAILY
Refills: 0 | Status: DISCONTINUED | OUTPATIENT
Start: 2022-03-24 | End: 2022-03-25

## 2022-03-24 RX ORDER — ACETAMINOPHEN 500 MG
2 TABLET ORAL
Qty: 0 | Refills: 0 | DISCHARGE
Start: 2022-03-24

## 2022-03-24 RX ORDER — ATORVASTATIN CALCIUM 80 MG/1
80 TABLET, FILM COATED ORAL AT BEDTIME
Refills: 0 | Status: DISCONTINUED | OUTPATIENT
Start: 2022-03-24 | End: 2022-03-25

## 2022-03-24 RX ORDER — METOPROLOL TARTRATE 50 MG
50 TABLET ORAL DAILY
Refills: 0 | Status: DISCONTINUED | OUTPATIENT
Start: 2022-03-24 | End: 2022-03-25

## 2022-03-24 RX ORDER — CHOLECALCIFEROL (VITAMIN D3) 125 MCG
1 CAPSULE ORAL
Qty: 0 | Refills: 0 | DISCHARGE

## 2022-03-24 RX ORDER — METOPROLOL TARTRATE 50 MG
1 TABLET ORAL
Qty: 0 | Refills: 0 | DISCHARGE

## 2022-03-24 RX ORDER — PANTOPRAZOLE SODIUM 20 MG/1
40 TABLET, DELAYED RELEASE ORAL
Refills: 0 | Status: DISCONTINUED | OUTPATIENT
Start: 2022-03-24 | End: 2022-03-25

## 2022-03-24 RX ORDER — SODIUM CHLORIDE 9 MG/ML
1000 INJECTION, SOLUTION INTRAVENOUS
Qty: 0 | Refills: 0 | DISCHARGE
Start: 2022-03-24

## 2022-03-24 RX ORDER — IRBESARTAN 75 MG/1
1 TABLET ORAL
Qty: 0 | Refills: 0 | DISCHARGE

## 2022-03-24 RX ORDER — TRAMADOL HYDROCHLORIDE 50 MG/1
0.5 TABLET ORAL
Qty: 0 | Refills: 0 | DISCHARGE
Start: 2022-03-24

## 2022-03-24 RX ORDER — AMLODIPINE BESYLATE 2.5 MG/1
1 TABLET ORAL
Qty: 0 | Refills: 0 | DISCHARGE

## 2022-03-24 RX ORDER — SODIUM CHLORIDE 9 MG/ML
3 INJECTION INTRAMUSCULAR; INTRAVENOUS; SUBCUTANEOUS EVERY 8 HOURS
Refills: 0 | Status: DISCONTINUED | OUTPATIENT
Start: 2022-03-24 | End: 2022-03-25

## 2022-03-24 RX ORDER — ASPIRIN/CALCIUM CARB/MAGNESIUM 324 MG
81 TABLET ORAL DAILY
Refills: 0 | Status: DISCONTINUED | OUTPATIENT
Start: 2022-03-24 | End: 2022-03-25

## 2022-03-24 RX ORDER — AMLODIPINE BESYLATE 2.5 MG/1
5 TABLET ORAL DAILY
Refills: 0 | Status: DISCONTINUED | OUTPATIENT
Start: 2022-03-24 | End: 2022-03-25

## 2022-03-24 RX ADMIN — AMLODIPINE BESYLATE 5 MILLIGRAM(S): 2.5 TABLET ORAL at 05:18

## 2022-03-24 RX ADMIN — SODIUM CHLORIDE 3 MILLILITER(S): 9 INJECTION INTRAMUSCULAR; INTRAVENOUS; SUBCUTANEOUS at 20:56

## 2022-03-24 RX ADMIN — LOSARTAN POTASSIUM 100 MILLIGRAM(S): 100 TABLET, FILM COATED ORAL at 05:18

## 2022-03-24 RX ADMIN — SODIUM CHLORIDE 60 MILLILITER(S): 9 INJECTION, SOLUTION INTRAVENOUS at 00:15

## 2022-03-24 RX ADMIN — Medication 50 MILLIGRAM(S): at 05:18

## 2022-03-24 NOTE — CONSULT NOTE ADULT - SUBJECTIVE AND OBJECTIVE BOX
[facility] - Patti Butler, Danuta, Loraine, Jaz, Qing Maurice  Office Number: [phone]    Initial Consult Note    CHIEF COMPLAINT: Patient is a 66y old  Male who presents with a chief complaint of left calcaneal fracture (24 Mar 2022 10:27)      HPI:  67 yo M PMHx HTN, HLD, occasional PVCs presented to the ED complaining of left ankle pain. Patient is mandarin speaking, refused telephone  services, preferred to be communicated w/ using son Yair Rodarte as . Patient was doing house work on a ladder 1 week ago when the ladder slipped and patient fell about 2-4 feet. Patient landed on his feet and sat down immediately after, denied any head strike, or loss of consciousness. Patient states since that fall, his left ankle/foot has been in pain, forcing him to ambulate using crutches to prevent bearing weight. Patient has been just treating the pain with chinese medicines and aspirin at home, but states he wanted to come to the ED when the pain did not resolve after a week. Patient seen and examined in the ED, denies any headache/lightheadedness/dizziness, CP, chest pressure, palpitations, abd pain, nausea/vomitting, diarrhea/constipation, fevers/chills. Patient only endorses left ankle/foot pain, achey in nature 3/10 non-radiating.     ER Course:   Vitals: 161/87 HR 90/minute RR 18/minute T 97.9 F 97% RA   Labs: insignificant   EKG: NSR @ 81bpm, normal axis, LVH, w/ flipped t-waves in III, AVF  Imaging:   CT Left Foot: Severely comminuted calcaneal fracture with significant impaction and comminution of the central to lateral margin of the posterior articular   facet. There is slightly displaced calcaneal fracture involvement of the   calcaneocuboid joint and nondisplaced fracture involvement of the middle   calcaneal facet.  X ray Left Foot: Comminuted, minimally displaced calcaneal fracture, CT ordered   (23 Mar 2022 19:47)    Communicated with patient with son as   cardiac wise, has a history of HTN and PVCs  is active at work, though 3 weeks ago, had an episode of chest pain and dyspnea, and felt like he was having a heart attack. He did not seek medical attention.  He has no chest pain or dyspnea at current time.      PAST MEDICAL & SURGICAL HISTORY:  HTN (hypertension)    HLD (hyperlipidemia)    PVC (premature ventricular contraction)    No significant past surgical history        SOCIAL HISTORY:  No tobacco, ethanol, or drug abuse.    FAMILY HISTORY:  FH: HTN (hypertension) (Father)      No family history of acute MI or sudden cardiac death.    MEDICATIONS  (STANDING):  amLODIPine   Tablet 5 milliGRAM(s) Oral daily  atorvastatin 20 milliGRAM(s) Oral at bedtime  dextrose 5% + sodium chloride 0.9%. 1000 milliLiter(s) (60 mL/Hr) IV Continuous <Continuous>  losartan 100 milliGRAM(s) Oral daily  metoprolol succinate ER 50 milliGRAM(s) Oral daily    MEDICATIONS  (PRN):  acetaminophen     Tablet .. 650 milliGRAM(s) Oral every 6 hours PRN Temp greater or equal to 38C (100.4F), Mild Pain (1 - 3)  aluminum hydroxide/magnesium hydroxide/simethicone Suspension 30 milliLiter(s) Oral every 4 hours PRN Dyspepsia  melatonin 3 milliGRAM(s) Oral at bedtime PRN Insomnia  ondansetron Injectable 4 milliGRAM(s) IV Push every 8 hours PRN Nausea and/or Vomiting  traMADol 25 milliGRAM(s) Oral every 6 hours PRN Moderate Pain (4 - 6)  traMADol 50 milliGRAM(s) Oral every 6 hours PRN Severe Pain (7 - 10)      Allergies    No Known Allergies    Intolerances        REVIEW OF SYSTEMS:    CONSTITUTIONAL: No weakness, fevers or chills  EYES/ENT: No visual changes;  No vertigo or throat pain   NECK: No pain or stiffness  RESPIRATORY: No cough, wheezing, hemoptysis; No shortness of breath  CARDIOVASCULAR: No chest pain or palpitations  GASTROINTESTINAL: No abdominal pain. No nausea, vomiting, or hematemesis; No diarrhea or constipation. No melena or hematochezia.  GENITOURINARY: No dysuria, frequency or hematuria  NEUROLOGICAL: No numbness or weakness  SKIN: No itching or rash  All other review of systems is negative unless indicated above    VITAL SIGNS:   Vital Signs Last 24 Hrs  T(C): 37 (24 Mar 2022 05:02), Max: 37 (24 Mar 2022 05:02)  T(F): 98.6 (24 Mar 2022 05:02), Max: 98.6 (24 Mar 2022 05:02)  HR: 74 (24 Mar 2022 05:02) (74 - 99)  BP: 124/63 (24 Mar 2022 05:02) (124/63 - 161/87)  BP(mean): --  RR: 17 (24 Mar 2022 05:02) (17 - 18)  SpO2: 96% (24 Mar 2022 05:02) (94% - 98%)    I&O's Summary    23 Mar 2022 07:01  -  24 Mar 2022 07:00  --------------------------------------------------------  IN: 420 mL / OUT: 400 mL / NET: 20 mL        On Exam:    Constitutional: NAD, alert and oriented x 3  Lungs:  Non-labored, breath sounds are clear bilaterally, No wheezing, rales or rhonchi  Cardiovascular: RRR.  S1 and S2 positive.  No murmurs, rubs, gallops or clicks  Gastrointestinal: Bowel Sounds present, soft, nontender.   Lymph: No peripheral edema. No cervical lymphadenopathy.  Neurological: Alert, no focal deficits  Skin: No rashes or ulcers   Psych:  Mood & affect appropriate.    LABS: All Labs Reviewed:                        13.2   10.20 )-----------( 347      ( 24 Mar 2022 07:42 )             39.2                         14.2   11.28 )-----------( 355      ( 23 Mar 2022 16:11 )             41.0     24 Mar 2022 07:42    141    |  108    |  16     ----------------------------<  110    4.2     |  27     |  0.91   23 Mar 2022 16:11    139    |  106    |  18     ----------------------------<  101    3.9     |  27     |  0.97     Ca    8.8        24 Mar 2022 07:42  Ca    9.0        23 Mar 2022 16:11    TPro  7.1    /  Alb  3.2    /  TBili  0.7    /  DBili  x      /  AST  19     /  ALT  25     /  AlkPhos  99     24 Mar 2022 07:42  TPro  7.7    /  Alb  3.6    /  TBili  0.6    /  DBili  x      /  AST  25     /  ALT  28     /  AlkPhos  110    23 Mar 2022 16:11    PT/INR - ( 24 Mar 2022 07:42 )   PT: 13.0 sec;   INR: 1.11 ratio         PTT - ( 24 Mar 2022 07:42 )  PTT:32.2 sec      Blood Culture:         RADIOLOGY:    EKG:    Assessment/Plan:  66y Male

## 2022-03-24 NOTE — DISCHARGE NOTE PROVIDER - NSDCFUADDAPPT_GEN_ALL_CORE_FT
Please follow up with your PCP within 1-2 weeks of discharge  Please follow up with your cardiologist within 1-2 weeks of discharge Please follow up with your PCP within 1-2 weeks of discharge  Please follow up with your cardiologist within 1-2 weeks of discharge    Podiatry Discharge Instructions:  Follow up: Please follow up with Dr. Sparrow within 1 week of discharge from the hospital, please call [phone] for appointment and discuss that you recently were seen in the hospital.  Wound Care: Please leave your dressing clean dry intact until your follow up appointment.  Weight bearing: Please remain nonweight bearing to the left lower extremity in a posterior splint.

## 2022-03-24 NOTE — DISCHARGE NOTE PROVIDER - NSDCMRMEDTOKEN_GEN_ALL_CORE_FT
amLODIPine 5 mg oral tablet: 1 tab(s) orally once a day  Aspirin Enteric Coated 81 mg oral delayed release tablet: 1 tab(s) orally once a day  atorvastatin 20 mg oral tablet: 1 tab(s) orally once a day  irbesartan 300 mg oral tablet: 1 tab(s) orally once a day  Metoprolol Succinate ER 50 mg oral tablet, extended release: 1 tab(s) orally once a day  omeprazole 20 mg oral delayed release capsule: 1 cap(s) orally once a day  Vitamin D3 50 mcg (2000 intl units) oral tablet: 1 tab(s) orally once a day   amLODIPine 5 mg oral tablet: 1 tab(s) orally once a day  Aspirin Enteric Coated 81 mg oral delayed release tablet: 1 tab(s) orally once a day  atorvastatin 80 mg oral tablet: 1 tab(s) orally once a day (at bedtime)  clopidogrel 75 mg oral tablet: 1 tab(s) orally once a day  irbesartan 300 mg oral tablet: 1 tab(s) orally once a day  Metoprolol Succinate ER 50 mg oral tablet, extended release: 1 tab(s) orally once a day  omeprazole 20 mg oral delayed release capsule: 1 cap(s) orally once a day  Vitamin D3 50 mcg (2000 intl units) oral tablet: 1 tab(s) orally once a day

## 2022-03-24 NOTE — PROGRESS NOTE ADULT - PROBLEM SELECTOR PLAN 2
Pt with abnormal EKG  - ? cardiac history ? Unclear History of CAD  - On ASA, atorvastatin, and Toprol at home  - Hold ASA in setting of planned surgery  - Continue home atorvastatin and Toprol   - F/u TTE   - Cardio ([facility]) consulted, f/u recs Pt with abnormal EKG-- pt had chest pain few weeks ago   - ? cardiac history ? Unclear History of CAD  - On ASA, atorvastatin, and Toprol at home  - Hold ASA in setting of planned surgery  - Continue atorvastatin and Toprol   - F/u TTE  shows an inferolateral wall motion abn of unclear chronicity  - Cardio ([facility])Dr Longo d/w at detail.  Plan to transfer pt for Cardiac Cath at [facility] today as abnormal EKG & ECHO findings, Pt is NON compliant, As per primary cardiology -pt had refused cardiac cath in 2018  Repeat EKG today was ordered along with Troponin which came High

## 2022-03-24 NOTE — PROGRESS NOTE ADULT - PROBLEM SELECTOR PLAN 1
Pt was scheduled for left calcaneal ORIF today, however will need cardiac cath prior to proceeding.  Pt will be transferred to [facility].  ~  Applied Phillips compression and posterior splint to left lower extremity; to be kept clean, dry and intact.  Pt is to remain non-weightbearing with the use of crutches.  Cont ice behind the left knee and elevation. Pt was scheduled for left calcaneal ORIF today, however will need cardiac cath prior to proceeding.  Pt will be transferred to [facility].  ~  Applied Phillips compression and posterior splint to left lower extremity; to be kept clean, dry and intact.  Pt is to remain non-weightbearing with the use of crutches.  Cont ice behind the left knee and elevation.  Pt is to follow up with Dr. Mitchell ([phone]) following d/c from [facility].

## 2022-03-24 NOTE — H&P CARDIOLOGY - HISTORY OF PRESENT ILLNESS
65 y/o M w/ PMH HTN, HLD, occasional PVCs and current Smoker presented to [place] ED complaining of left ankle pain. Patient was doing house work on a ladder 1 week ago when the ladder slipped and patient fell about 2-4 feet. Patient landed on his feet and sat down immediately after, denied any head strike, or loss of consciousness. Pt found to have left calcaneal comminuted fracture and podiatry consult was called. Podiatry is recommending ORIF and cardiology consult was called for pre-op clearance. Echocardiogram showed an inferolateral wall WMA and initial troponin is elevated at 3015. Pt admits to intermittent shortness of breath and chest pain at rest. Pt states that he develops chest pain during times of stress and he describes it was left sided with radiation to his jaw + left shoulder + left arm. Pt symptoms last for about 15 minutes and is self resolving. Pt's last episode of chest pain was 3 weeks ago but admits it has been happening for years. Pt is now transferred to [facility] for cardiac catheretization.

## 2022-03-24 NOTE — CHART NOTE - NSCHARTNOTEFT_GEN_A_CORE
Right radial band removed at the bedside. Patient tolerated removal well without any issues. Extremity remains neurovascularly intact pre and post removal. No active bleeding or hematoma noted after band removal. Site covered with DSD and tegaderm. Discussed with RN to continue to monitor site for any issues.

## 2022-03-24 NOTE — PROGRESS NOTE ADULT - SUBJECTIVE AND OBJECTIVE BOX
Patient is a 66y old  Male who presents with a chief complaint of left calcaneal fracture (23 Mar 2022 19:47)    HPI:  67 yo M PMHx HTN, HLD, occasional PVCs presented to the ED complaining of left ankle pain. Patient is mandarin speaking, refused telephone  services, preferred to be communicated w/ using eitan Rodarte as . Patient was doing house work on a ladder 1 week ago when the ladder slipped and patient fell about 2-4 feet. Patient landed on his feet and sat down immediately after, denied any head strike, or loss of consciousness. Patient states since that fall, his left ankle/foot has been in pain, forcing him to ambulate using crutches to prevent bearing weight. Patient has been just treating the pain with chinese medicines and aspirin at home, but states he wanted to come to the ED when the pain did not resolve after a week. Patient seen and examined in the ED, denies any headache/lightheadedness/dizziness, CP, chest pressure, palpitations, abd pain, nausea/vomitting, diarrhea/constipation, fevers/chills. Patient only endorses left ankle/foot pain, achey in nature 3/10 non-radiating.     ER Course:   Vitals: 161/87 HR 90/minute RR 18/minute T 97.9 F 97% RA   Labs: insignificant   EKG: NSR @ 81bpm, normal axis, LVH, w/ flipped t-waves in III, AVF  Imaging:   CT Left Foot: Severely comminuted calcaneal fracture with significant impaction and comminution of the central to lateral margin of the posterior articular   facet. There is slightly displaced calcaneal fracture involvement of the   calcaneocuboid joint and nondisplaced fracture involvement of the middle   calcaneal facet.  X ray Left Foot: Comminuted, minimally displaced calcaneal fracture, CT ordered   (23 Mar 2022 19:47)    INTERVAL HPI:  3/24/22: Patient seen and examined at bedside. Declined  services, preferred to use eitan Mazariegos as . Appears comfortable, feeling well. Endorses mild left-sided chest pressure. Denies L foot pain, fever, chills, SOB, abdominal pain, n/v/d/d/c.     OVERNIGHT EVENTS: None     Home Medications:  amLODIPine 5 mg oral tablet: 1 tab(s) orally once a day (23 Mar 2022 20:13)  aspirin 81 mg oral delayed release tablet: 1 tab(s) orally once a day (23 Mar 2022 20:13)  atorvastatin 20 mg oral tablet: 1 tab(s) orally once a day (23 Mar 2022 20:13)  irbesartan 300 mg oral tablet: 1 tab(s) orally once a day (23 Mar 2022 20:13)  Metoprolol Succinate ER 50 mg oral tablet, extended release: 1 tab(s) orally once a day (23 Mar 2022 20:13)      MEDICATIONS  (STANDING):  amLODIPine   Tablet 5 milliGRAM(s) Oral daily  atorvastatin 20 milliGRAM(s) Oral at bedtime  dextrose 5% + sodium chloride 0.9%. 1000 milliLiter(s) (60 mL/Hr) IV Continuous <Continuous>  losartan 100 milliGRAM(s) Oral daily  metoprolol succinate ER 50 milliGRAM(s) Oral daily    MEDICATIONS  (PRN):  acetaminophen     Tablet .. 650 milliGRAM(s) Oral every 6 hours PRN Temp greater or equal to 38C (100.4F), Mild Pain (1 - 3)  aluminum hydroxide/magnesium hydroxide/simethicone Suspension 30 milliLiter(s) Oral every 4 hours PRN Dyspepsia  melatonin 3 milliGRAM(s) Oral at bedtime PRN Insomnia  ondansetron Injectable 4 milliGRAM(s) IV Push every 8 hours PRN Nausea and/or Vomiting  traMADol 25 milliGRAM(s) Oral every 6 hours PRN Moderate Pain (4 - 6)  traMADol 50 milliGRAM(s) Oral every 6 hours PRN Severe Pain (7 - 10)      Allergies    No Known Allergies    Intolerances        Social History:  lives with wife and two sons  etoh- occasionally   tobacco- current everyday smoker, smokes a pack per day, 40 pack year history   drugs - none  ambulates - independently before injury, now ambulates w/ crutches   adls independent (23 Mar 2022 19:47)      REVIEW OF SYSTEMS:  CONSTITUTIONAL: No fever, No chills, No fatigue, No myalgia, No Body ache, No Weakness  EYES: No eye pain,  No visual disturbances, No discharge, NO Redness  ENMT:  No ear pain, No nose bleed, No vertigo; No sinus pain, NO throat pain, No Congestion  NECK: No pain, No stiffness  RESPIRATORY: No cough, NO wheezing, No  hemoptysis, NO  shortness of breath  CARDIOVASCULAR: +chest pain, No palpitations  GASTROINTESTINAL: No abdominal pain, NO epigastric pain. No nausea, No vomiting; No diarrhea, No constipation. [ x ] BM  GENITOURINARY: No dysuria, No frequency, No urgency, No hematuria, NO incontinence  NEUROLOGICAL: No headaches, No dizziness, No numbness, No tingling, No tremors, No weakness  EXT: No Swelling, No Pain, No Edema  SKIN:  [ x ] No itching, burning, rashes, or lesions   MUSCULOSKELETAL: No joint pain ,No Jt swelling; No muscle pain, No back pain, No extremity pain  PSYCHIATRIC: No depression,  No anxiety,  No mood swings ,No difficulty sleeping at night  PAIN SCALE: [  ] None  [ x ] Other - mild chest pain   ROS Unable to obtain due to - [  ] Dementia  [  ] Lethargy [  ] Drowsy [  ] Sedated [  ] non verbal  REST OF REVIEW Of SYSTEM - [ x ] Normal     Vital Signs Last 24 Hrs  T(C): 37 (24 Mar 2022 05:02), Max: 37 (24 Mar 2022 05:02)  T(F): 98.6 (24 Mar 2022 05:02), Max: 98.6 (24 Mar 2022 05:02)  HR: 74 (24 Mar 2022 05:02) (74 - 99)  BP: 124/63 (24 Mar 2022 05:02) (124/63 - 161/87)  BP(mean): --  RR: 17 (24 Mar 2022 05:02) (17 - 18)  SpO2: 96% (24 Mar 2022 05:02) (94% - 98%)  Finger Stick        03-23 @ 07:01  -  03-24 @ 07:00  --------------------------------------------------------  IN: 420 mL / OUT: 400 mL / NET: 20 mL        PHYSICAL EXAM:  GENERAL:  [ x ] NAD , [ x ] well appearing, [  ] Agitated, [  ] Drowsy,  [  ] Lethargy, [  ] confused   HEAD:  [ x ] Normal, [  ] Other  EYES:  [ x ] EOMI, [ x ] PERRLA, [ x ] conjunctiva and sclera clear normal, [  ] Other,  [  ] Pallor,[  ] Discharge  ENMT:  [ x ] Normal, [ x ] Moist mucous membranes, [ x ] Good dentition, [ x ] No Thrush  NECK:  [ x ] Supple, [ x ] No JVD, [ x ] Normal thyroid, [  ] Lymphadenopathy [  ] Other  CHEST/LUNG:  [ x ] Clear to auscultation bilaterally, [ x ] Breath Sounds equal B/L / Decrease, [  ] poor effort  [ x ] No rales, [ x ] No rhonchi  [ x ]  No wheezing,   HEART:  [ x ] Regular rate and rhythm, [  ] tachycardia, [  ] Bradycardia,  [  ] irregular  [ x ] No murmurs, No rubs, No gallops, [  ] PPM in place (Mfr:  )  ABDOMEN:  [ x ] Soft, [ x ] Nontender, [ x ] Nondistended, [ x ] No mass, [ x ] Bowel sounds present, [  ] obese  NERVOUS SYSTEM:  [ x ] Alert & Oriented X3, [ x ] Nonfocal  [  ] Confusion  [  ] Encephalopathic [  ] Sedated [  ] Unable to assess, [  ] Dementia [  ] Other-  EXTREMITIES: [ x ] 2+ Peripheral Pulses, No clubbing, No cyanosis,  [  ] edema B/L lower EXT. [  ] PVD stasis skin changes B/L Lower EXT, [  ] wound; L foot mild swelling and erythema  LYMPH: No lymphadenopathy noted  SKIN:  [ x ] No rashes or lesions, [  ] Pressure Ulcers, [  ] ecchymosis, [  ] Skin Tears, [  ] Other    DIET: Diet, NPO after Midnight:      NPO Start Date: 23-Mar-2022,   NPO Start Time: 23:59  Except Medications (03-23-22 @ 20:38)  Diet, DASH/TLC:   Sodium & Cholesterol Restricted (03-23-22 @ 20:06)      LABS:                        13.2   10.20 )-----------( 347      ( 24 Mar 2022 07:42 )             39.2     24 Mar 2022 07:42    141    |  108    |  16     ----------------------------<  110    4.2     |  27     |  0.91     Ca    8.8        24 Mar 2022 07:42    TPro  7.1    /  Alb  3.2    /  TBili  0.7    /  DBili  x      /  AST  19     /  ALT  25     /  AlkPhos  99     24 Mar 2022 07:42    PT/INR - ( 24 Mar 2022 07:42 )   PT: 13.0 sec;   INR: 1.11 ratio         PTT - ( 24 Mar 2022 07:42 )  PTT:32.2 sec                         Anemia Panel:      Thyroid Panel:                RADIOLOGY & ADDITIONAL TESTS:      HEALTH ISSUES - PROBLEM Dx:  Calcaneus fracture, left    Hypertension    Need for prophylactic measure    HLD (hyperlipidemia)    Leukocytosis    Abnormal EKG            Consultant(s) Notes Reviewed:  [ x ] YES     Care Discussed with [X] Consultants  [ x ] Patient  [ x ] Family [  ] HCP [  ]   [  ] Social Service  [  ] RN, [  ] Physical Therapy,[  ] Palliative care team  DVT PPX: [  ] Lovenox, [  ] S C Heparin, [  ] Coumadin, [  ] Xarelto, [  ] Eliquis, [  ] Pradaxa, [  ] IV Heparin drip, [ x ] SCD [  ] Contraindication 2 to GI Bleed,[  ] Ambulation [  ] Contraindicated 2 to  bleed [  ] Contraindicated 2 to Brain Bleed  Advanced directive: [ x ] None, [  ] DNR/DNI Patient is a 66y old  Male who presents with a chief complaint of left calcaneal fracture (23 Mar 2022 19:47)    HPI:  65 yo M PMHx HTN, HLD, occasional PVCs presented to the ED complaining of left ankle pain. Patient is mandarin speaking, refused telephone  services, preferred to be communicated w/ using eitan Rodarte as . Patient was doing house work on a ladder 1 week ago when the ladder slipped and patient fell about 2-4 feet. Patient landed on his feet and sat down immediately after, denied any head strike, or loss of consciousness. Patient states since that fall, his left ankle/foot has been in pain, forcing him to ambulate using crutches to prevent bearing weight. Patient has been just treating the pain with chinese medicines and aspirin at home, but states he wanted to come to the ED when the pain did not resolve after a week. Patient seen and examined in the ED, denies any headache/lightheadedness/dizziness, CP, chest pressure, palpitations, abd pain, nausea/vomitting, diarrhea/constipation, fevers/chills. Patient only endorses left ankle/foot pain, achey in nature 3/10 non-radiating.     ER Course:   Vitals: 161/87 HR 90/minute RR 18/minute T 97.9 F 97% RA   Labs: insignificant   EKG: NSR @ 81bpm, normal axis, LVH, w/ flipped t-waves in III, AVF  Imaging:   CT Left Foot: Severely comminuted calcaneal fracture with significant impaction and comminution of the central to lateral margin of the posterior articular   facet. There is slightly displaced calcaneal fracture involvement of the   calcaneocuboid joint and nondisplaced fracture involvement of the middle   calcaneal facet.  X ray Left Foot: Comminuted, minimally displaced calcaneal fracture, CT ordered   (23 Mar 2022 19:47)    INTERVAL HPI:  3/24/22: Patient seen and examined at bedside. Declined  services, preferred to use eitan Mazariegos as . Appears comfortable, feeling well. Endorses mild left-sided chest pressure. Denies L foot pain, fever, chills, SOB, abdominal pain, n/v/d/d/c. Plan for Transfer for cardiac Cath for inferior.wall hypokinesia      OVERNIGHT EVENTS: None     Home Medications:  amLODIPine 5 mg oral tablet: 1 tab(s) orally once a day (23 Mar 2022 20:13)  aspirin 81 mg oral delayed release tablet: 1 tab(s) orally once a day (23 Mar 2022 20:13)  atorvastatin 20 mg oral tablet: 1 tab(s) orally once a day (23 Mar 2022 20:13)  irbesartan 300 mg oral tablet: 1 tab(s) orally once a day (23 Mar 2022 20:13)  Metoprolol Succinate ER 50 mg oral tablet, extended release: 1 tab(s) orally once a day (23 Mar 2022 20:13)      MEDICATIONS  (STANDING):  amLODIPine   Tablet 5 milliGRAM(s) Oral daily  atorvastatin 20 milliGRAM(s) Oral at bedtime  dextrose 5% + sodium chloride 0.9%. 1000 milliLiter(s) (60 mL/Hr) IV Continuous <Continuous>  losartan 100 milliGRAM(s) Oral daily  metoprolol succinate ER 50 milliGRAM(s) Oral daily    MEDICATIONS  (PRN):  acetaminophen     Tablet .. 650 milliGRAM(s) Oral every 6 hours PRN Temp greater or equal to 38C (100.4F), Mild Pain (1 - 3)  aluminum hydroxide/magnesium hydroxide/simethicone Suspension 30 milliLiter(s) Oral every 4 hours PRN Dyspepsia  melatonin 3 milliGRAM(s) Oral at bedtime PRN Insomnia  ondansetron Injectable 4 milliGRAM(s) IV Push every 8 hours PRN Nausea and/or Vomiting  traMADol 25 milliGRAM(s) Oral every 6 hours PRN Moderate Pain (4 - 6)  traMADol 50 milliGRAM(s) Oral every 6 hours PRN Severe Pain (7 - 10)      Allergies    No Known Allergies    Intolerances        Social History:  lives with wife and two sons  etoh- occasionally   tobacco- current everyday smoker, smokes a pack per day, 40 pack year history   drugs - none  ambulates - independently before injury, now ambulates w/ crutches   adls independent (23 Mar 2022 19:47)      REVIEW OF SYSTEMS:  CONSTITUTIONAL: No fever, No chills, No fatigue, No myalgia, No Body ache, No Weakness  EYES: No eye pain,  No visual disturbances, No discharge, NO Redness  ENMT:  No ear pain, No nose bleed, No vertigo; No sinus pain, NO throat pain, No Congestion  NECK: No pain, No stiffness  RESPIRATORY: No cough, NO wheezing, No  hemoptysis, NO  shortness of breath  CARDIOVASCULAR: +chest pain, No palpitations  GASTROINTESTINAL: No abdominal pain, NO epigastric pain. No nausea, No vomiting; No diarrhea, No constipation. [ x ] BM  GENITOURINARY: No dysuria, No frequency, No urgency, No hematuria, NO incontinence  NEUROLOGICAL: No headaches, No dizziness, No numbness, No tingling, No tremors, No weakness  EXT: No Swelling, No Pain, No Edema  SKIN:  [ x ] No itching, burning, rashes, or lesions   MUSCULOSKELETAL: No joint pain ,No Jt swelling; No muscle pain, No back pain, No extremity pain  PSYCHIATRIC: No depression,  No anxiety,  No mood swings ,No difficulty sleeping at night  PAIN SCALE: [  ] None  [ x ] Other - mild chest pain   ROS Unable to obtain due to - [  ] Dementia  [  ] Lethargy [  ] Drowsy [  ] Sedated [  ] non verbal  REST OF REVIEW Of SYSTEM - [ x ] Normal     Vital Signs Last 24 Hrs  T(C): 37 (24 Mar 2022 05:02), Max: 37 (24 Mar 2022 05:02)  T(F): 98.6 (24 Mar 2022 05:02), Max: 98.6 (24 Mar 2022 05:02)  HR: 74 (24 Mar 2022 05:02) (74 - 99)  BP: 124/63 (24 Mar 2022 05:02) (124/63 - 161/87)  BP(mean): --  RR: 17 (24 Mar 2022 05:02) (17 - 18)  SpO2: 96% (24 Mar 2022 05:02) (94% - 98%)  Finger Stick        03-23 @ 07:01  -  03-24 @ 07:00  --------------------------------------------------------  IN: 420 mL / OUT: 400 mL / NET: 20 mL        PHYSICAL EXAM:  GENERAL:  [ x ] NAD , [ x ] well appearing, [  ] Agitated, [  ] Drowsy,  [  ] Lethargy, [  ] confused   HEAD:  [ x ] Normal, [  ] Other  EYES:  [ x ] EOMI, [ x ] PERRLA, [ x ] conjunctiva and sclera clear normal, [  ] Other,  [  ] Pallor,[  ] Discharge  ENMT:  [ x ] Normal, [ x ] Moist mucous membranes, [ x ] Good dentition, [ x ] No Thrush  NECK:  [ x ] Supple, [ x ] No JVD, [ x ] Normal thyroid, [  ] Lymphadenopathy [  ] Other  CHEST/LUNG:  [ x ] Clear to auscultation bilaterally, [ x ] Breath Sounds equal B/L / Decrease, [  ] poor effort  [ x ] No rales, [ x ] No rhonchi  [ x ]  No wheezing,   HEART:  [ x ] Regular rate and rhythm, [  ] tachycardia, [  ] Bradycardia,  [  ] irregular  [ x ] No murmurs, No rubs, No gallops, [  ] PPM in place (Mfr:  )  ABDOMEN:  [ x ] Soft, [ x ] Nontender, [ x ] Nondistended, [ x ] No mass, [ x ] Bowel sounds present, [  ] obese  NERVOUS SYSTEM:  [ x ] Alert & Oriented X3, [ x ] Nonfocal  [  ] Confusion  [  ] Encephalopathic [  ] Sedated [  ] Unable to assess, [  ] Dementia [  ] Other-  EXTREMITIES: [ x ] 2+ Peripheral Pulses, No clubbing, No cyanosis,  [  ] edema B/L lower EXT. [  ] PVD stasis skin changes B/L Lower EXT, [  ] wound; L foot mild swelling and erythema  LYMPH: No lymphadenopathy noted  SKIN:  [ x ] No rashes or lesions, [  ] Pressure Ulcers, [  ] ecchymosis, [  ] Skin Tears, [  ] Other    DIET: Diet, NPO after Midnight:      NPO Start Date: 23-Mar-2022,   NPO Start Time: 23:59  Except Medications (03-23-22 @ 20:38)  Diet, DASH/TLC:   Sodium & Cholesterol Restricted (03-23-22 @ 20:06)      LABS:                        13.2   10.20 )-----------( 347      ( 24 Mar 2022 07:42 )             39.2     24 Mar 2022 07:42    141    |  108    |  16     ----------------------------<  110    4.2     |  27     |  0.91     Ca    8.8        24 Mar 2022 07:42    TPro  7.1    /  Alb  3.2    /  TBili  0.7    /  DBili  x      /  AST  19     /  ALT  25     /  AlkPhos  99     24 Mar 2022 07:42    PT/INR - ( 24 Mar 2022 07:42 )   PT: 13.0 sec;   INR: 1.11 ratio         PTT - ( 24 Mar 2022 07:42 )  PTT:32.2 sec                         Anemia Panel:      Thyroid Panel:                RADIOLOGY & ADDITIONAL TESTS:      HEALTH ISSUES - PROBLEM Dx:  Calcaneus fracture, left    Hypertension    Need for prophylactic measure    HLD (hyperlipidemia)    Leukocytosis    Abnormal EKG            Consultant(s) Notes Reviewed:  [ x ] YES     Care Discussed with [X] Consultants  [ x ] Patient  [ x ] Family [  ] HCP [  ]   [  ] Social Service  [  ] RN, [  ] Physical Therapy,[  ] Palliative care team  DVT PPX: [  ] Lovenox, [  ] S C Heparin, [  ] Coumadin, [  ] Xarelto, [  ] Eliquis, [  ] Pradaxa, [  ] IV Heparin drip, [ x ] SCD [  ] Contraindication 2 to GI Bleed,[  ] Ambulation [  ] Contraindicated 2 to  bleed [  ] Contraindicated 2 to Brain Bleed  Advanced directive: [ x ] None, [  ] DNR/DNI Patient is a 66y old  Male who presents with a chief complaint of left calcaneal fracture (23 Mar 2022 19:47)    HPI:  67 yo M PMHx HTN, HLD, occasional PVCs presented to the ED complaining of left ankle pain. Patient is mandarin speaking, refused telephone  services, preferred to be communicated w/ using eitan Rodarte as . Patient was doing house work on a ladder 1 week ago when the ladder slipped and patient fell about 2-4 feet. Patient landed on his feet and sat down immediately after, denied any head strike, or loss of consciousness. Patient states since that fall, his left ankle/foot has been in pain, forcing him to ambulate using crutches to prevent bearing weight. Patient has been just treating the pain with chinese medicines and aspirin at home, but states he wanted to come to the ED when the pain did not resolve after a week. Patient seen and examined in the ED, denies any headache/lightheadedness/dizziness, CP, chest pressure, palpitations, abd pain, nausea/vomitting, diarrhea/constipation, fevers/chills. Patient only endorses left ankle/foot pain, achey in nature 3/10 non-radiating.     ER Course:   Vitals: 161/87 HR 90/minute RR 18/minute T 97.9 F 97% RA   Labs: insignificant   EKG: NSR @ 81bpm, normal axis, LVH, w/ flipped t-waves in III, AVF  Imaging:   CT Left Foot: Severely comminuted calcaneal fracture with significant impaction and comminution of the central to lateral margin of the posterior articular   facet. There is slightly displaced calcaneal fracture involvement of the   calcaneocuboid joint and nondisplaced fracture involvement of the middle   calcaneal facet.  X ray Left Foot: Comminuted, minimally displaced calcaneal fracture, CT ordered   (23 Mar 2022 19:47)    INTERVAL HPI:  3/24/22: Patient seen and examined at bedside. Declined  services, preferred to use eitan Mazariegos as . Appears comfortable, feeling well. Endorses mild left-sided chest pressure. Denies L foot pain, fever, chills, SOB, abdominal pain, n/v/d/d/c. Plan for Transfer for cardiac Cath for inferior wall hypokinesia      OVERNIGHT EVENTS: None     Home Medications:  amLODIPine 5 mg oral tablet: 1 tab(s) orally once a day (23 Mar 2022 20:13)  aspirin 81 mg oral delayed release tablet: 1 tab(s) orally once a day (23 Mar 2022 20:13)  atorvastatin 20 mg oral tablet: 1 tab(s) orally once a day (23 Mar 2022 20:13)  irbesartan 300 mg oral tablet: 1 tab(s) orally once a day (23 Mar 2022 20:13)  Metoprolol Succinate ER 50 mg oral tablet, extended release: 1 tab(s) orally once a day (23 Mar 2022 20:13)      MEDICATIONS  (STANDING):  amLODIPine   Tablet 5 milliGRAM(s) Oral daily  atorvastatin 20 milliGRAM(s) Oral at bedtime  dextrose 5% + sodium chloride 0.9%. 1000 milliLiter(s) (60 mL/Hr) IV Continuous <Continuous>  losartan 100 milliGRAM(s) Oral daily  metoprolol succinate ER 50 milliGRAM(s) Oral daily    MEDICATIONS  (PRN):  acetaminophen     Tablet .. 650 milliGRAM(s) Oral every 6 hours PRN Temp greater or equal to 38C (100.4F), Mild Pain (1 - 3)  aluminum hydroxide/magnesium hydroxide/simethicone Suspension 30 milliLiter(s) Oral every 4 hours PRN Dyspepsia  melatonin 3 milliGRAM(s) Oral at bedtime PRN Insomnia  ondansetron Injectable 4 milliGRAM(s) IV Push every 8 hours PRN Nausea and/or Vomiting  traMADol 25 milliGRAM(s) Oral every 6 hours PRN Moderate Pain (4 - 6)  traMADol 50 milliGRAM(s) Oral every 6 hours PRN Severe Pain (7 - 10)      Allergies    No Known Allergies    Intolerances        Social History:  lives with wife and two sons  etoh- occasionally   tobacco- current everyday smoker, smokes a pack per day, 40 pack year history   drugs - none  ambulates - independently before injury, now ambulates w/ crutches   adls independent (23 Mar 2022 19:47)      REVIEW OF SYSTEMS:No Complaints   CONSTITUTIONAL: No fever, No chills, No fatigue, No myalgia, No Body ache, No Weakness  EYES: No eye pain,  No visual disturbances, No discharge, NO Redness  ENMT:  No ear pain, No nose bleed, No vertigo; No sinus pain, NO throat pain, No Congestion  NECK: No pain, No stiffness  RESPIRATORY: No cough, NO wheezing, No  hemoptysis, NO  shortness of breath  CARDIOVASCULAR: +chest pain, No palpitations  GASTROINTESTINAL: No abdominal pain, NO epigastric pain. No nausea, No vomiting; No diarrhea, No constipation. [ x ] BM  GENITOURINARY: No dysuria, No frequency, No urgency, No hematuria, NO incontinence  NEUROLOGICAL: No headaches, No dizziness, No numbness, No tingling, No tremors, No weakness  EXT: No Swelling, No Pain, No Edema  SKIN:  [ x ] No itching, burning, rashes, or lesions   MUSCULOSKELETAL: No joint pain ,No Jt swelling; No muscle pain, No back pain, No extremity pain  PSYCHIATRIC: No depression,  No anxiety,  No mood swings ,No difficulty sleeping at night  PAIN SCALE: [  ] None  [ x ] Other - mild chest pain   ROS Unable to obtain due to - [  ] Dementia  [  ] Lethargy [  ] Drowsy [  ] Sedated [  ] non verbal  REST OF REVIEW Of SYSTEM - [ x ] Normal     Vital Signs Last 24 Hrs  T(C): 37 (24 Mar 2022 05:02), Max: 37 (24 Mar 2022 05:02)  T(F): 98.6 (24 Mar 2022 05:02), Max: 98.6 (24 Mar 2022 05:02)  HR: 74 (24 Mar 2022 05:02) (74 - 99)  BP: 124/63 (24 Mar 2022 05:02) (124/63 - 161/87)  BP(mean): --  RR: 17 (24 Mar 2022 05:02) (17 - 18)  SpO2: 96% (24 Mar 2022 05:02) (94% - 98%)  Finger Stick        03-23 @ 07:01  -  03-24 @ 07:00  --------------------------------------------------------  IN: 420 mL / OUT: 400 mL / NET: 20 mL        PHYSICAL EXAM:  GENERAL:  [ x ] NAD , [ x ] well appearing, [  ] Agitated, [  ] Drowsy,  [  ] Lethargy, [  ] confused   HEAD:  [ x ] Normal, [  ] Other  EYES:  [ x ] EOMI, [ x ] PERRLA, [ x ] conjunctiva and sclera clear normal, [  ] Other,  [  ] Pallor,[  ] Discharge  ENMT:  [ x ] Normal, [ x ] Moist mucous membranes, [ x ] Good dentition, [ x ] No Thrush  NECK:  [ x ] Supple, [ x ] No JVD, [ x ] Normal thyroid, [  ] Lymphadenopathy [  ] Other  CHEST/LUNG:  [ x ] Clear to auscultation bilaterally, [ x ] Breath Sounds equal B/L / Decrease, [  ] poor effort  [ x ] No rales, [ x ] No rhonchi  [ x ]  No wheezing,   HEART:  [ x ] Regular rate and rhythm, [  ] tachycardia, [  ] Bradycardia,  [  ] irregular  [ x ] No murmurs, No rubs, No gallops, [  ] PPM in place (Mfr:  )  ABDOMEN:  [ x ] Soft, [ x ] Nontender, [ x ] Nondistended, [ x ] No mass, [ x ] Bowel sounds present, [  ] obese  NERVOUS SYSTEM:  [ x ] Alert & Oriented X3, [ x ] Nonfocal  [  ] Confusion  [  ] Encephalopathic [  ] Sedated [  ] Unable to assess, [  ] Dementia [  ] Other-  EXTREMITIES: [ x ] 2+ Peripheral Pulses, No clubbing, No cyanosis,  [  ] edema B/L lower EXT. [  ] PVD stasis skin changes B/L Lower EXT, [  ] wound; L foot mild swelling and erythema  LYMPH: No lymphadenopathy noted  SKIN:  [ x ] No rashes or lesions, [  ] Pressure Ulcers, [  ] ecchymosis, [  ] Skin Tears, [  ] Other    DIET: Diet, NPO after Midnight:      NPO Start Date: 23-Mar-2022,   NPO Start Time: 23:59  Except Medications (03-23-22 @ 20:38)  Diet, DASH/TLC:   Sodium & Cholesterol Restricted (03-23-22 @ 20:06)      LABS:                        13.2   10.20 )-----------( 347      ( 24 Mar 2022 07:42 )             39.2     24 Mar 2022 07:42    141    |  108    |  16     ----------------------------<  110    4.2     |  27     |  0.91     Ca    8.8        24 Mar 2022 07:42    TPro  7.1    /  Alb  3.2    /  TBili  0.7    /  DBili  x      /  AST  19     /  ALT  25     /  AlkPhos  99     24 Mar 2022 07:42    PT/INR - ( 24 Mar 2022 07:42 )   PT: 13.0 sec;   INR: 1.11 ratio         PTT - ( 24 Mar 2022 07:42 )  PTT:32.2 sec      RADIOLOGY & ADDITIONAL TESTS:        HEALTH ISSUES - PROBLEM Dx:  Calcaneus fracture, left    Hypertension    Need for prophylactic measure    HLD (hyperlipidemia)    Leukocytosis    Abnormal EKG            Consultant(s) Notes Reviewed:  [ x ] YES     Care Discussed with [X] Consultants  [ x ] Patient  [ x ] Family -son [  ] HCP [  ]   [  ] Social Service  [ x ] RN, [  ] Physical Therapy,[  ] Palliative care team  DVT PPX: [  ] Lovenox, [  ] S C Heparin, [  ] Coumadin, [  ] Xarelto, [  ] Eliquis, [  ] Pradaxa, [  ] IV Heparin drip, [ x ] SCD [  ] Contraindication 2 to GI Bleed,[  ] Ambulation [  ] Contraindicated 2 to  bleed [  ] Contraindicated 2 to Brain Bleed  Advanced directive: [ x ] None, [  ] DNR/DNI

## 2022-03-24 NOTE — PROGRESS NOTE ADULT - ASSESSMENT
Left calcaneal comminuted fracture
65 yo M PMHx HTN, HLD, PVCs presenting to ED w/ left foot pain s/p mechanical fall 1 week ago, admitted for left calceneal fracture repair.

## 2022-03-24 NOTE — PROVIDER CONTACT NOTE (CRITICAL VALUE NOTIFICATION) - ACTION/TREATMENT ORDERED:
Pt was already transferred to [facility], Dr JOSELO Greene stated that she would contact Dr Longo and make him aware

## 2022-03-24 NOTE — DISCHARGE NOTE PROVIDER - NSDCCPCAREPLAN_GEN_ALL_CORE_FT
PRINCIPAL DISCHARGE DIAGNOSIS  Diagnosis: Calcaneus fracture, left  Assessment and Plan of Treatment: You were found to have a fracture of the calcaneus bone of your left foot. You were seen by a podiatrist and were recommended to have surgery after undergoing cardiac catheterization. Your foot was splinted to help minimize further pain/injury.      SECONDARY DISCHARGE DIAGNOSES  Diagnosis: Abnormal EKG  Assessment and Plan of Treatment: You were seen by a cardiologst due to abnormal EKG findings and for cardiac optimization for surgery. You had an echocardiogram done which showed abnormal walll motion abnormalities in the inferiolateral portions of your heart. You are being transferred for cardiac catheterization and further cardiac workup. Please continue your home atorvastatin and metoprolol as previously prescribed.   STOP taking aspirin in setting of upcoming cardiac catheterization. Do not restart until discussing it with your cardiologist and podiatrist.    Diagnosis: Leukocytosis  Assessment and Plan of Treatment: You had an elevated white blood cell count which was likely caused by stress and inflammation from your fracture. You were monitored off of antibiotics and your white blood cell count normalized.    Diagnosis: HLD (hyperlipidemia)  Assessment and Plan of Treatment: Please continue your home atorvastatin as previously prescribed.    Diagnosis: Hypertension  Assessment and Plan of Treatment: Please continue your home amlodipine, ibersartan, and metoprolol as previously prescribed.     PRINCIPAL DISCHARGE DIAGNOSIS  Diagnosis: Calcaneus fracture, left  Assessment and Plan of Treatment: You were found to have a fracture of the calcaneus bone of your left foot. You were seen by a podiatrist and were recommended to have surgery after undergoing cardiac catheterization. Your foot was splinted to help minimize further pain/injury.      SECONDARY DISCHARGE DIAGNOSES  Diagnosis: Abnormal EKG  Assessment and Plan of Treatment: You were seen by a cardiologst due to abnormal EKG findings and for cardiac optimization for surgery. You had an echocardiogram done and preliminary results showed which showed wall motion abnormalities in the inferolateral walls of your heart. You are being transferred for cardiac catheterization and further cardiac workup. Please continue your home atorvastatin and metoprolol as previously prescribed.   STOP taking aspirin in setting of upcoming cardiac catheterization. Do not restart until discussing it with your cardiologist and podiatrist.    Diagnosis: Leukocytosis  Assessment and Plan of Treatment: You had an elevated white blood cell count which was likely caused by stress and inflammation from your fracture. You were monitored off of antibiotics and your white blood cell count normalized.    Diagnosis: HLD (hyperlipidemia)  Assessment and Plan of Treatment: Please continue your home atorvastatin as previously prescribed.    Diagnosis: Hypertension  Assessment and Plan of Treatment: Please continue your home amlodipine, ibersartan, and metoprolol as previously prescribed.     PRINCIPAL DISCHARGE DIAGNOSIS  Diagnosis: Calcaneus fracture, left  Assessment and Plan of Treatment: You were found to have a fracture of the calcaneus bone of your left foot.  -NON weight bearing left lower Ext  - You were seen by a podiatrist and were recommended to have surgery after undergoing cardiac catheterization. Your foot was splinted to help minimize further pain/injury.      SECONDARY DISCHARGE DIAGNOSES  Diagnosis: Abnormal EKG  Assessment and Plan of Treatment: You were seen by a cardiologst due to abnormal EKG findings and for cardiac optimization for surgery. Possible 2/2 old MI  -You had an echocardiogram done and preliminary results showed which showed wall motion abnormalities in the inferolateral walls of your heart. You are being transferred for cardiac catheterization and further cardiac workup. Please continue your home atorvastatin and metoprolol as previously prescribed.   STOP taking aspirin in setting of upcoming cardiac catheterization. Do not restart until discussing it with your cardiologist and podiatrist.    Diagnosis: Hypertension  Assessment and Plan of Treatment: Please continue your home amlodipine, ibersartan, and metoprolol as previously prescribed.    Diagnosis: HLD (hyperlipidemia)  Assessment and Plan of Treatment: Please continue your home atorvastatin as previously prescribed.    Diagnosis: Leukocytosis  Assessment and Plan of Treatment: You had an elevated white blood cell count which was likely caused by stress and inflammation from your fracture. You were monitored off of antibiotics and your white blood cell count normalized.     PRINCIPAL DISCHARGE DIAGNOSIS  Diagnosis: Calcaneus fracture, left  Assessment and Plan of Treatment: You were found to have a fracture of the calcaneus bone of your left foot.  -NON weight bearing left lower Ext  - You were seen by a podiatrist and were recommended to have surgery after undergoing cardiac catheterization. Your foot was splinted to help minimize further pain/injury.      SECONDARY DISCHARGE DIAGNOSES  Diagnosis: Abnormal EKG  Assessment and Plan of Treatment: You were seen by a cardiologst due to abnormal EKG findings and for cardiac optimization for surgery. Possible 2/2 old MI, pt likely has underlying CAD , pt had refused Cardiac cath in 2018 as per primary cardiology  -You had an echocardiogram done and preliminary results showed which showed wall motion abnormalities in the inferolateral walls of your heart. You are being transferred for cardiac catheterization and further cardiac workup. Please continue your home atorvastatin and metoprolol as previously prescribed.   STOP taking aspirin in setting of upcoming cardiac catheterization. Do not restart until discussing it with your cardiologist and podiatrist.    Diagnosis: Hypertension  Assessment and Plan of Treatment: Please continue your home amlodipine, ibersartan, and metoprolol as previously prescribed.    Diagnosis: HLD (hyperlipidemia)  Assessment and Plan of Treatment: Please continue your home atorvastatin as previously prescribed.    Diagnosis: Leukocytosis  Assessment and Plan of Treatment: You had an elevated white blood cell count which was likely caused by stress and inflammation from your fracture. You were monitored off of antibiotics and your white blood cell count normalized.

## 2022-03-24 NOTE — DISCHARGE NOTE PROVIDER - HOSPITAL COURSE
65 y/o male with a PMHx HTN, HLD, PVCs, current smoker, presented to [place] ED complaining of left ankle pain s/p fall from a ladder last week, found to have a left calcaneal comminuted fracture pending ORIF with podiatry. Pre-operative workup revealed an echocardiogram which showed inferolateral wall motion abnormalities with elevated troponin levels. Patient was transferred to [facility] for left cardiac catheterization; which revealed proximal Cx 99% s/p NESHA x 1. mRCA 70% small vessel. RPDA 100%  with collaterals. RRA access. ASA and Plavix. 67 y/o male with a PMHx HTN, HLD, PVCs, current smoker, presented to [place] ED complaining of left ankle pain s/p fall from a ladder last week, found to have a left calcaneal comminuted fracture pending ORIF with podiatry. Pre-operative workup revealed an echocardiogram which showed inferolateral wall motion abnormalities with elevated troponin levels. Patient was transferred to [facility] for left cardiac catheterization via RRA; which revealed proximal Cx 99% s/p NESHA x 1. mRCA 70% small vessel. RPDA 100%  with collaterals. Patient started on Plavix and is to continue on ASA.    **INCOMPLETE**  Case reviewed and discussed with . _____, patient is medically stable and optimized for discharge. All medications were reviewed and prescriptions were sent to mutually agreed upon pharmacy. 65 y/o male with a PMHx HTN, HLD, PVCs, current smoker, presented to [place] ED complaining of left ankle pain s/p fall from a ladder last week, found to have a left calcaneal comminuted fracture pending ORIF with podiatry. Pre-operative workup revealed an echocardiogram which showed inferolateral wall motion abnormalities with elevated troponin levels. Patient was transferred to [facility] for left cardiac catheterization via RRA; which revealed proximal Cx 99% s/p NESHA x 1. mRCA 70% small vessel. RPDA 100%  with collaterals. Patient started on Plavix and is to continue on ASA.  Patient to follow up with podiatry outpatient. Patient to use crutched and non-weight bear to left lower extremity.     Case reviewed and discussed with Dr. Curiel via fellow patient is medically stable and optimized for discharge. All medications were reviewed and prescriptions were sent to mutually agreed upon pharmacy.

## 2022-03-24 NOTE — PATIENT PROFILE ADULT - FALL HARM RISK - HARM RISK INTERVENTIONS

## 2022-03-24 NOTE — DISCHARGE NOTE PROVIDER - NSDCCPCAREPLAN_GEN_ALL_CORE_FT
PRINCIPAL DISCHARGE DIAGNOSIS  Diagnosis: Chest pain  Assessment and Plan of Treatment: You presented to [place] ED for further evaluation of left ankle pain after sustaining a fall from a ladder last week and found to have a fracture. You were supposed to get an operation with podiatry but on cardiology evaluation, your echocardiogram was abnormal and had elevated cardiac enzymes. You were transferred to [facility] for a cardiac catheterization and had a stent placed in one of your arteries. You were started on Plavix 75mg daily and are to continue taking Aspirin 81mg daily. Please follow up with your primary care doctor and cardiologist within 1-2 weeks of discharge for continuation of care.       PRINCIPAL DISCHARGE DIAGNOSIS  Diagnosis: Chest pain  Assessment and Plan of Treatment: You presented to [place] ED for further evaluation of left ankle pain after sustaining a fall from a ladder last week and found to have a fracture. You were supposed to get an operation with podiatry but on cardiology evaluation, your echocardiogram was abnormal and had elevated cardiac enzymes. You were transferred to [facility] for a cardiac catheterization and had a stent placed in one of your arteries. You were started on Plavix 75mg daily and are to continue taking Aspirin 81mg daily. Please follow up with your primary care doctor and cardiologist within 1-2 weeks of discharge for continuation of care.      SECONDARY DISCHARGE DIAGNOSES  Diagnosis: Calcaneal fracture  Assessment and Plan of Treatment: Phillips compression and posterior splint to left lower extremity; to be kept clean, dry and intact.  Remain non-weightbearing with the use of crutches.  Cont ice behind the left knee and elevation  Follow up with Dr. Mitchell ([phone])     PRINCIPAL DISCHARGE DIAGNOSIS  Diagnosis: Chest pain  Assessment and Plan of Treatment: You presented to [place] ED for further evaluation of left ankle pain after sustaining a fall from a ladder last week and found to have a fracture. You were supposed to get an operation with podiatry but on cardiology evaluation, your echocardiogram was abnormal and had elevated cardiac enzymes. You were transferred to [facility] for a cardiac catheterization and had a stent placed in one of your arteries. You were started on Plavix 75mg daily and are to continue taking Aspirin 81mg daily. Please follow up with your primary care doctor and cardiologist within 1-2 weeks of discharge for continuation of care.      SECONDARY DISCHARGE DIAGNOSES  Diagnosis: Calcaneal fracture  Assessment and Plan of Treatment: Phillips compression and posterior splint to left lower extremity; to be kept clean, dry and intact.  Remain non-weightbearing with the use of crutches.  Cont ice behind the left knee and elevation  Return rto [facility] via the ER on Wednesday March 30th   You are scheduled for surgery at [facility] for Thursday March 31st

## 2022-03-24 NOTE — PROGRESS NOTE ADULT - PROBLEM SELECTOR PLAN 3
WBC 11.28 on admission, now resolved   - Likely reactive 2/2 to inflammation/stress associated w/ left calcaneal fracture   - Remains afebrile, nontoxic appearing, no s/s of acute infection   - Continue monitoring off abx for now   - Trend WBC count daily

## 2022-03-24 NOTE — DISCHARGE NOTE PROVIDER - CARE PROVIDERS DIRECT ADDRESSES
,DirectAddress_Unknown ,DirectAddress_Unknown,[email or web address] ,[email or web address],DirectAddress_Unknown

## 2022-03-24 NOTE — PROGRESS NOTE ADULT - PROBLEM SELECTOR PLAN 1
CT Left foot: Severely comminuted calcaneal fracture with significant impaction s/p known fall   - podiatry consulted, plan for left calcaneal fracture repair today   - Continue IVF while NPO   - Will hold patients home ASA 81 for now  - Pain regimen: Tylenol 650mg for mild, ultram 25mg q6H for moderate, ultram 50mg q6H for severe   - Cardio ([facility]) consulted for clearance  - RCRI Score 0 (Class I). Patient with no modifiable risk factors at this time. He is low risk and medically optimized pending cardiac optimization for schedule low risk procedure.

## 2022-03-24 NOTE — DISCHARGE NOTE PROVIDER - PROVIDER TOKENS
PROVIDER:[TOKEN:[[id number]],FOLLOWUP:[1 week],ESTABLISHEDPATIENT:[T]],PROVIDER:[TOKEN:[[id number]],FOLLOWUP:[1 week],ESTABLISHEDPATIENT:[T]]

## 2022-03-24 NOTE — PROGRESS NOTE ADULT - PROBLEM SELECTOR PLAN 6
DVT ppx: SCD's in setting of planned surgery. Will restart chemical prophylaxis when surgically appropriate

## 2022-03-24 NOTE — CONSULT NOTE ADULT - ASSESSMENT
Mr. Rodarte is a 66 year old male with HTN, HLD, occasional PVCs presented to the ED complaining of left ankle pain and found to have a calcaneal fracture.  Called to evaluate for cardiac clearance.     - though he is active at work, a few weeks ago, had an episode of chest pain and dyspnea, and felt like he was having a heart attack. He did not seek medical attention.  - his ekg today demonstrates a sr with inferior q waves, without an old ekg to compare  - his echocardiogram shows an inferolateral wall motion abn of unclear chronicity  - would try to obtain his old cardiac records if possible.  - if the above ekg and echo findings are new, an ischemic evaluation is warranted and will need a cardiac cath  - will need to speak to podiatry regarding the urgency of his surgery, and would prefer to postpone it in the short term, if not emergent.  - will follow with you
Left calcaneal fracture

## 2022-03-24 NOTE — PROGRESS NOTE ADULT - PROBLEM SELECTOR PLAN 4
Chronic, BP acceptable on admission, mildly elevated likely 2/2 to pain   - Continue home irbesartan therapeutic interchange losartan 100mg qD  - Continue home metoprolol succinate 50mg qD  - Routine hemodynamic monitoring

## 2022-03-24 NOTE — CHART NOTE - NSCHARTNOTEFT_GEN_A_CORE
[facility] MEDICINE NIGHT COVERAGE    Patient seen at bedside status post cath via R radial artery. Site clean, dry and intact. No bleeding, underlying hematoma, or erythema. Son at bedside providing translation. Patient denies chest pain, SOB, numbness/weakness/tingling. Pulses present, capillary refill appropriate. Patient educated and understands if any of the above symptoms were to arise, to notify RN. Will continue to monitor closely.    T(C): 36.6 (03-24-22 @ 21:15), Max: 37 (03-24-22 @ 05:02)  HR: 66 (03-24-22 @ 21:15) (66 - 97)  BP: 101/60 (03-24-22 @ 21:15) (101/60 - 157/84)  RR: 16 (03-24-22 @ 21:15) (16 - 17)  SpO2: 100% (03-24-22 @ 21:15) (93% - 100%)    Rufina Rob PA-C  Medicine [id number] Spironolactone Counseling: Patient advised regarding risks of diarrhea, abdominal pain, hyperkalemia, birth defects (for female patients), liver toxicity and renal toxicity. The patient may need blood work to monitor liver and kidney function and potassium levels while on therapy. The patient verbalized understanding of the proper use and possible adverse effects of spironolactone.  All of the patient's questions and concerns were addressed.

## 2022-03-24 NOTE — DISCHARGE NOTE PROVIDER - HOSPITAL COURSE
FROM ADMISSION H+P:   HPI:  65 yo M PMHx HTN, HLD, occasional PVCs presented to the ED complaining of left ankle pain. Patient is mandarin speaking, refused telephone  services, preferred to be communicated w/ using son Yair Rodarte as . Patient was doing house work on a ladder 1 week ago when the ladder slipped and patient fell about 2-4 feet. Patient landed on his feet and sat down immediately after, denied any head strike, or loss of consciousness. Patient states since that fall, his left ankle/foot has been in pain, forcing him to ambulate using crutches to prevent bearing weight. Patient has been just treating the pain with chinese medicines and aspirin at home, but states he wanted to come to the ED when the pain did not resolve after a week. Patient seen and examined in the ED, denies any headache/lightheadedness/dizziness, CP, chest pressure, palpitations, abd pain, nausea/vomitting, diarrhea/constipation, fevers/chills. Patient only endorses left ankle/foot pain, achey in nature 3/10 non-radiating.     ER Course:   Vitals: 161/87 HR 90/minute RR 18/minute T 97.9 F 97% RA   Labs: insignificant   EKG: NSR @ 81bpm, normal axis, LVH, w/ flipped t-waves in III, AVF  Imaging:   CT Left Foot: Severely comminuted calcaneal fracture with significant impaction and comminution of the central to lateral margin of the posterior articular   facet. There is slightly displaced calcaneal fracture involvement of the   calcaneocuboid joint and nondisplaced fracture involvement of the middle   calcaneal facet.  X ray Left Foot: Comminuted, minimally displaced calcaneal fracture, CT ordered   (23 Mar 2022 19:47)      ---  HOSPITAL COURSE:   Pt was admitted for Left calcaneal fracture repair. CT Left foot showed severely comminuted calcaneal fracture with significant impaction. Was evaluated by Podiatry and was started on a pain regimen. Per records obtained from PCP office, pt had a TTE in 2018, which showed an EF 30% and at that time pt had refused cardiac cath. Cardio(Dr. Longo) was consulted for cardiac clearance, who recommended TTE, which on pre-jasmine read showed hypokinetic wall motion of inferolateral walls. Patient had accepted cardiac cath for transfer. On day of discharge, pt had a temporary splint placed by podiatry.         Patient was seen and examined on day of discharge:    Patient's clinical status improved during her admission and is stable for discharge to _____.  ---  CONSULTANTS:   Cardio(Luke hernandez)  Podiatry(Dr. Avila)    ---      FINAL DISCHARGE DIAGNOSIS LIST:  Please see last daily progress note for final discharge diagnoses    --- FROM ADMISSION H+P:   HPI:  65 yo M PMHx HTN, HLD, occasional PVCs presented to the ED complaining of left ankle pain. Patient is mandarin speaking, refused telephone  services, preferred to be communicated w/ using son Yair Rodarte as . Patient was doing house work on a ladder 1 week ago when the ladder slipped and patient fell about 2-4 feet. Patient landed on his feet and sat down immediately after, denied any head strike, or loss of consciousness. Patient states since that fall, his left ankle/foot has been in pain, forcing him to ambulate using crutches to prevent bearing weight. Patient has been just treating the pain with chinese medicines and aspirin at home, but states he wanted to come to the ED when the pain did not resolve after a week. Patient seen and examined in the ED, denies any headache/lightheadedness/dizziness, CP, chest pressure, palpitations, abd pain, nausea/vomitting, diarrhea/constipation, fevers/chills. Patient only endorses left ankle/foot pain, achey in nature 3/10 non-radiating.     ER Course:   Vitals: 161/87 HR 90/minute RR 18/minute T 97.9 F 97% RA   Labs: insignificant   EKG: NSR @ 81bpm, normal axis, LVH, w/ flipped t-waves in III, AVF  Imaging:   CT Left Foot: Severely comminuted calcaneal fracture with significant impaction and comminution of the central to lateral margin of the posterior articular   facet. There is slightly displaced calcaneal fracture involvement of the   calcaneocuboid joint and nondisplaced fracture involvement of the middle   calcaneal facet.  X ray Left Foot: Comminuted, minimally displaced calcaneal fracture, CT ordered   (23 Mar 2022 19:47)      ---  HOSPITAL COURSE:   Pt was admitted for Left calcaneal fracture repair. CT Left foot showed severely comminuted calcaneal fracture with significant impaction. Was evaluated by Podiatry and was started on a pain regimen. Per records obtained from PCP office, pt had a TTE in 2018, which showed an EF 30% and at that time pt had refused cardiac cath. Cardio(Dr. Longo) was consulted for cardiac clearance, who recommended TTE, which on pre-jasmine read showed hypokinetic wall motion of inferolateral walls. Patient had accepted cardiac cath for transfer. On day of discharge, pt had a temporary splint placed by podiatry.         Patient was seen and examined on day of discharge and is deemed medically stable for transfer for cardiac cath.     ---  CONSULTANTS:   Cardio(Luke hernandez)  Podiatry(Dr. Avila)    ---      FINAL DISCHARGE DIAGNOSIS LIST:  Please see last daily progress note for final discharge diagnoses    --- FROM ADMISSION H+P:   HPI:  65 yo M PMHx HTN, HLD, occasional PVCs presented to the ED complaining of left ankle pain. Patient is mandarin speaking, refused telephone  services, preferred to be communicated w/ using son Yair Rodarte as . Patient was doing house work on a ladder 1 week ago when the ladder slipped and patient fell about 2-4 feet. Patient landed on his feet and sat down immediately after, denied any head strike, or loss of consciousness. Patient states since that fall, his left ankle/foot has been in pain, forcing him to ambulate using crutches to prevent bearing weight. Patient has been just treating the pain with chinese medicines and aspirin at home, but states he wanted to come to the ED when the pain did not resolve after a week. Patient seen and examined in the ED, denies any headache/lightheadedness/dizziness, CP, chest pressure, palpitations, abd pain, nausea/vomitting, diarrhea/constipation, fevers/chills. Patient only endorses left ankle/foot pain, achey in nature 3/10 non-radiating.     ER Course:   Vitals: 161/87 HR 90/minute RR 18/minute T 97.9 F 97% RA   Labs: insignificant   EKG: NSR @ 81bpm, normal axis, LVH, w/ flipped t-waves in III, AVF  Imaging:   CT Left Foot: Severely comminuted calcaneal fracture with significant impaction and comminution of the central to lateral margin of the posterior articular   facet. There is slightly displaced calcaneal fracture involvement of the   calcaneocuboid joint and nondisplaced fracture involvement of the middle   calcaneal facet.  X ray Left Foot: Comminuted, minimally displaced calcaneal fracture, CT ordered   (23 Mar 2022 19:47)      ---  HOSPITAL COURSE:   Pt was admitted for Left calcaneal fracture repair. CT Left foot showed severely comminuted calcaneal fracture with significant impaction. Was evaluated by Podiatry and was started on a pain regimen. Per records obtained from PCP office, pt had a TTE in 2018, which showed an EF 30% and at that time pt had refused cardiac cath. Cardio(Dr. Longo) was consulted for cardiac clearance, who recommended TTE, which on pre-jasmine read showed hypokinetic wall motion of inferolateral walls. Patient had accepted cardiac cath for transfer. On day of discharge, pt had a temporary splint placed by podiatry. ASA on hols for cardiac Cath.        Patient was seen and examined on day of discharge and is deemed medically stable for transfer for cardiac cath. Pt is going to [facility] for cardiac Cath with Dr Jhon Curiel, today, prior to Left ankle surgery. Pt & Son agrees for transfer to [facility] for Cardiac Cath    ---  CONSULTANTS:   Cardio(Luke hernandez)  Podiatry(Dr. Avila)    ---      FINAL DISCHARGE DIAGNOSIS LIST:  Please see last daily progress note for final discharge diagnoses    --- FROM ADMISSION H+P:   HPI:  67 yo M PMHx HTN, HLD, occasional PVCs presented to the ED complaining of left ankle pain. Patient is mandarin speaking, refused telephone  services, preferred to be communicated w/ using son Yair Rodarte as . Patient was doing house work on a ladder 1 week ago when the ladder slipped and patient fell about 2-4 feet. Patient landed on his feet and sat down immediately after, denied any head strike, or loss of consciousness. Patient states since that fall, his left ankle/foot has been in pain, forcing him to ambulate using crutches to prevent bearing weight. Patient has been just treating the pain with chinese medicines and aspirin at home, but states he wanted to come to the ED when the pain did not resolve after a week. Patient seen and examined in the ED, denies any headache/lightheadedness/dizziness, CP, chest pressure, palpitations, abd pain, nausea/vomitting, diarrhea/constipation, fevers/chills. Patient only endorses left ankle/foot pain, achey in nature 3/10 non-radiating.     ER Course:   Vitals: 161/87 HR 90/minute RR 18/minute T 97.9 F 97% RA   Labs: insignificant   EKG: NSR @ 81bpm, normal axis, LVH, w/ flipped t-waves in III, AVF  Imaging:   CT Left Foot: Severely comminuted calcaneal fracture with significant impaction and comminution of the central to lateral margin of the posterior articular   facet. There is slightly displaced calcaneal fracture involvement of the   calcaneocuboid joint and nondisplaced fracture involvement of the middle   calcaneal facet.  X ray Left Foot: Comminuted, minimally displaced calcaneal fracture, CT ordered   (23 Mar 2022 19:47)      ---  HOSPITAL COURSE:   Pt was admitted for Left calcaneal fracture repair. CT Left foot showed severely comminuted calcaneal fracture with significant impaction. Was evaluated by Podiatry and was started on a pain regimen. Per records obtained from PCP office, pt had a TTE in 2018, which showed an EF 30% and at that time pt had refused cardiac cath. Cardio(Dr. Longo) was consulted for cardiac clearance, who recommended TTE, which on pre-jasmine read showed hypokinetic wall motion of inferolateral walls. Patient had accepted cardiac cath for transfer. On day of discharge, pt had a temporary splint placed by podiatry. ASA on hols for cardiac Cath.        Patient was seen and examined on day of discharge and is deemed medically stable for transfer for cardiac cath. Pt is going to [facility] for cardiac Cath with Dr Jhon Curiel, today, prior to Left ankle surgery. Pt & Son agrees for transfer to [facility] for Cardiac Cath, Total care time is 60 minutes.    ---  CONSULTANTS:   Cardio(Luke hernandez)  Podiatry(Dr. Avila)    ---      FINAL DISCHARGE DIAGNOSIS LIST:  Please see last daily progress note for final discharge diagnoses    ---

## 2022-03-24 NOTE — PROGRESS NOTE ADULT - SUBJECTIVE AND OBJECTIVE BOX
HPI:  66 year old Male with PMHx of hypertension seen bedside for left calcaneal fracture. Pt was scheduled to go for open reduction internal fixation today, however upon reviewing EKG and ECHO, Cardio recommended that pt be transferred to [facility] for a cardiac cath. Denies any fever, chills, nausea, vomiting, chest pain, shortness of breath, or calf pain at this time.    PAST MEDICAL HISTORY:  HLD (hyperlipidemia)     HTN (hypertension)     PVC (premature ventricular contraction).     PAST SURGICAL HISTORY:  No significant past surgical history.     FAMILY HISTORY:  Father  Still living? Unknown  FH: HTN (hypertension), Age at diagnosis: Age Unknown.    Allergies    No Known Allergies    Intolerances      Social History: lives with wife and two sons  etoh- occasionally   tobacco- current everyday smoker, smokes a pack per day, 40 pack year history   drugs - none  ambulates - independently before injury, now ambulates w/ crutches   adls independent      Vital Signs Last 24 Hrs  T(C): 36.6 (24 Mar 2022 12:52), Max: 37 (24 Mar 2022 05:02)  T(F): 97.8 (24 Mar 2022 12:52), Max: 98.6 (24 Mar 2022 05:02)  HR: 69 (24 Mar 2022 12:52) (69 - 99)  BP: 121/66 (24 Mar 2022 12:52) (121/66 - 157/84)  BP(mean): --  RR: 16 (24 Mar 2022 12:52) (16 - 18)  SpO2: 93% (24 Mar 2022 12:52) (93% - 98%)    PHYSICAL EXAM:  Vascular: DP & PT palpable bilaterally, Capillary refill 3 seconds, Digital hair present bilaterally, left medial and lateral heel border ecchymosis, left foot non-pitting edema.  Neurological: Light touch sensation intact bilaterally.  Musculoskeletal: AJ & STJ ROM intact, pain on palpation to anterior lateral ankle, no pain on lateral compression of the left heel.  Dermatological: No open wounds or lesions b/l. No fracture blisters.    Left lower extremity CT:   IMPRESSION:  1. Severely comminuted calcaneal fracture with significant impaction and   comminution of the central to lateral margin of the posterior articular   facet.  2. There is slightly displaced calcaneal fracture involvement of the   calcaneocuboid joint and nondisplaced fracture involvement of the middle   calcaneal facet.

## 2022-03-24 NOTE — DISCHARGE NOTE PROVIDER - CARE PROVIDER_API CALL
Vicente Pearson  Internal Medicine  [address] [address]  Phone: [phone]  Fax: [phone]  Established Patient  Follow Up Time: 1 week    Osman Sanabria  Cardiovascular Diseases  [address], [address]  [place] [address]  Phone: [phone]  Fax: [phone]  Established Patient  Follow Up Time: 1 week

## 2022-03-24 NOTE — DISCHARGE NOTE PROVIDER - NSDCMRMEDTOKEN_GEN_ALL_CORE_FT
amLODIPine 5 mg oral tablet: 1 tab(s) orally once a day  aspirin 81 mg oral delayed release tablet: 1 tab(s) orally once a day  atorvastatin 20 mg oral tablet: 1 tab(s) orally once a day  irbesartan 300 mg oral tablet: 1 tab(s) orally once a day  Metoprolol Succinate ER 50 mg oral tablet, extended release: 1 tab(s) orally once a day   acetaminophen 325 mg oral tablet: 2 tab(s) orally every 6 hours, As needed, Temp greater or equal to 38C (100.4F), Mild Pain (1 - 3)  amLODIPine 5 mg oral tablet: 1 tab(s) orally once a day  atorvastatin 20 mg oral tablet: 1 tab(s) orally once a day  Dextrose 5% with 0.9% NaCl intravenous solution: 1000 milliliter(s) intravenous   irbesartan 300 mg oral tablet: 1 tab(s) orally once a day  Metoprolol Succinate ER 50 mg oral tablet, extended release: 1 tab(s) orally once a day  traMADol 50 mg oral tablet: 0.5 tab(s) orally every 6 hours, As needed, Moderate Pain (4 - 6)  Ultram 50 mg oral tablet: 1 tab(s) orally every 6 hours, As needed, Severe Pain (7 - 10)   acetaminophen 325 mg oral tablet: 2 tab(s) orally every 6 hours, As needed, Mild Pain (1 - 3)  amLODIPine 5 mg oral tablet: 1 tab(s) orally once a day  atorvastatin 20 mg oral tablet: 1 tab(s) orally once a day  irbesartan 300 mg oral tablet: 1 tab(s) orally once a day  Metoprolol Succinate ER 50 mg oral tablet, extended release: 1 tab(s) orally once a day  traMADol 50 mg oral tablet: 0.5 tab(s) orally every 6 hours, As needed, Moderate Pain (4 - 6)  Ultram 50 mg oral tablet: 1 tab(s) orally every 6 hours, As needed, Severe Pain (7 - 10)

## 2022-03-24 NOTE — PROGRESS NOTE ADULT - ATTENDING COMMENTS
65 yo M PMHx HTN, HLD, PVCs presenting to ED w/ left foot pain s/p mechanical fall 1 week ago, admitted for left calcaneal fracture repair.   pt seen, examined, case & care plan d/w pt, residents at detail.  D/W Son at detail.  NPO for Now for cardiac Cath today.  Pain meds PRN. NON weight bearing left foot 65 yo M PMHx HTN, HLD, PVCs presenting to ED w/ left foot pain s/p mechanical fall 1 week ago, admitted for left calcaneal fracture repair.   pt seen, examined, case & care plan d/w pt, residents at detail.  D/W Son & Pt  at detail about Cardiac Cath today at [facility], needs transfer. Pt & Son Both agreed for Cath/Transfer.  NPO for Now for cardiac Cath today.  Pain meds PRN. NON weight bearing left foot.  D/W DR Longo at detail.  Total Care time is 60 minutes.

## 2022-03-24 NOTE — DISCHARGE NOTE PROVIDER - NSDCFUADDINST_GEN_ALL_CORE_FT
If having any worsening or persistent symptoms, please seek medical attention immediately or come back to the hospital for further evaluation. If having any worsening or persistent symptoms, please seek medical attention immediately or come back to the hospital for further evaluation.    No weight bearing on left lower extremity If having any worsening or persistent symptoms, please seek medical attention immediately or come back to the hospital for further evaluation.     No weight bearing on left lower extremity

## 2022-03-24 NOTE — DISCHARGE NOTE PROVIDER - PROVIDER TOKENS
FREE:[LAST:[Please follow up with your PCP within 1-2 weeks of discharge],PHONE:[(   )    -],FAX:[(   )    -]] FREE:[LAST:[Please follow up with your PCP within 1-2 weeks of discharge],PHONE:[(   )    -],FAX:[(   )    -]],PROVIDER:[TOKEN:[[id number]]] PROVIDER:[TOKEN:[[id number]]],FREE:[LAST:[Please follow up with your PCP within 1-2 weeks of discharge],PHONE:[(   )    -],FAX:[(   )    -]]

## 2022-03-24 NOTE — DISCHARGE NOTE PROVIDER - CARE PROVIDER_API CALL
Please follow up with your PCP within 1-2 weeks of discharge,   Phone: (   )    -  Fax: (   )    -  Follow Up Time:    Please follow up with your PCP within 1-2 weeks of discharge,   Phone: (   )    -  Fax: (   )    -  Follow Up Time:     Mumtaz Curiel (MD)  Cardiovascular Disease; Internal Medicine; Interventional Cardiology  [address] [place] [address], [id number]  [place] [address]  Phone: [phone]  Fax: [phone]  Follow Up Time:    Mumtaz Curiel)  Cardiovascular Disease; Internal Medicine; Interventional Cardiology  [address] [place] [address], [id number]  [place] [address]  Phone: [phone]  Fax: [phone]  Follow Up Time:     Please follow up with your PCP within 1-2 weeks of discharge,   Phone: (   )    -  Fax: (   )    -  Follow Up Time:

## 2022-03-25 ENCOUNTER — TRANSCRIPTION ENCOUNTER (OUTPATIENT)
Age: 67
End: 2022-03-25

## 2022-03-25 VITALS
OXYGEN SATURATION: 99 % | HEART RATE: 78 BPM | SYSTOLIC BLOOD PRESSURE: 101 MMHG | DIASTOLIC BLOOD PRESSURE: 62 MMHG | RESPIRATION RATE: 20 BRPM | TEMPERATURE: 98 F

## 2022-03-25 LAB
A1C WITH ESTIMATED AVERAGE GLUCOSE RESULT: 5.6 % — SIGNIFICANT CHANGE UP (ref 4–5.6)
ALBUMIN SERPL ELPH-MCNC: 3.7 G/DL — SIGNIFICANT CHANGE UP (ref 3.3–5)
ALP SERPL-CCNC: 99 U/L — SIGNIFICANT CHANGE UP (ref 40–120)
ALT FLD-CCNC: 25 U/L — SIGNIFICANT CHANGE UP (ref 4–41)
ANION GAP SERPL CALC-SCNC: 15 MMOL/L — HIGH (ref 7–14)
AST SERPL-CCNC: 80 U/L — HIGH (ref 4–40)
BASOPHILS # BLD AUTO: 0.05 K/UL — SIGNIFICANT CHANGE UP (ref 0–0.2)
BASOPHILS NFR BLD AUTO: 0.4 % — SIGNIFICANT CHANGE UP (ref 0–2)
BILIRUB SERPL-MCNC: 0.6 MG/DL — SIGNIFICANT CHANGE UP (ref 0.2–1.2)
BUN SERPL-MCNC: 19 MG/DL — SIGNIFICANT CHANGE UP (ref 7–23)
CALCIUM SERPL-MCNC: 8.9 MG/DL — SIGNIFICANT CHANGE UP (ref 8.4–10.5)
CHLORIDE SERPL-SCNC: 106 MMOL/L — SIGNIFICANT CHANGE UP (ref 98–107)
CHOLEST SERPL-MCNC: 143 MG/DL — SIGNIFICANT CHANGE UP
CO2 SERPL-SCNC: 20 MMOL/L — LOW (ref 22–31)
CREAT SERPL-MCNC: 1.02 MG/DL — SIGNIFICANT CHANGE UP (ref 0.5–1.3)
EGFR: 82 ML/MIN/1.73M2 — SIGNIFICANT CHANGE UP
EOSINOPHIL # BLD AUTO: 0.34 K/UL — SIGNIFICANT CHANGE UP (ref 0–0.5)
EOSINOPHIL NFR BLD AUTO: 2.9 % — SIGNIFICANT CHANGE UP (ref 0–6)
ESTIMATED AVERAGE GLUCOSE: 114 — SIGNIFICANT CHANGE UP
GLUCOSE SERPL-MCNC: 107 MG/DL — HIGH (ref 70–99)
HCT VFR BLD CALC: 38.9 % — LOW (ref 39–50)
HDLC SERPL-MCNC: 34 MG/DL — LOW
HGB BLD-MCNC: 13.4 G/DL — SIGNIFICANT CHANGE UP (ref 13–17)
IANC: 8.1 K/UL — HIGH (ref 1.8–7.4)
IMM GRANULOCYTES NFR BLD AUTO: 0.3 % — SIGNIFICANT CHANGE UP (ref 0–1.5)
LIPID PNL WITH DIRECT LDL SERPL: 95 MG/DL — SIGNIFICANT CHANGE UP
LYMPHOCYTES # BLD AUTO: 18.6 % — SIGNIFICANT CHANGE UP (ref 13–44)
LYMPHOCYTES # BLD AUTO: 2.21 K/UL — SIGNIFICANT CHANGE UP (ref 1–3.3)
MAGNESIUM SERPL-MCNC: 2.2 MG/DL — SIGNIFICANT CHANGE UP (ref 1.6–2.6)
MCHC RBC-ENTMCNC: 29.8 PG — SIGNIFICANT CHANGE UP (ref 27–34)
MCHC RBC-ENTMCNC: 34.4 GM/DL — SIGNIFICANT CHANGE UP (ref 32–36)
MCV RBC AUTO: 86.6 FL — SIGNIFICANT CHANGE UP (ref 80–100)
MONOCYTES # BLD AUTO: 1.13 K/UL — HIGH (ref 0–0.9)
MONOCYTES NFR BLD AUTO: 9.5 % — SIGNIFICANT CHANGE UP (ref 2–14)
NEUTROPHILS # BLD AUTO: 8.1 K/UL — HIGH (ref 1.8–7.4)
NEUTROPHILS NFR BLD AUTO: 68.3 % — SIGNIFICANT CHANGE UP (ref 43–77)
NON HDL CHOLESTEROL: 109 MG/DL — SIGNIFICANT CHANGE UP
NRBC # BLD: 0 /100 WBCS — SIGNIFICANT CHANGE UP
NRBC # FLD: 0 K/UL — SIGNIFICANT CHANGE UP
PHOSPHATE SERPL-MCNC: 3.6 MG/DL — SIGNIFICANT CHANGE UP (ref 2.5–4.5)
PLATELET # BLD AUTO: 342 K/UL — SIGNIFICANT CHANGE UP (ref 150–400)
POTASSIUM SERPL-MCNC: 4.1 MMOL/L — SIGNIFICANT CHANGE UP (ref 3.5–5.3)
POTASSIUM SERPL-SCNC: 4.1 MMOL/L — SIGNIFICANT CHANGE UP (ref 3.5–5.3)
PROT SERPL-MCNC: 7 G/DL — SIGNIFICANT CHANGE UP (ref 6–8.3)
RBC # BLD: 4.49 M/UL — SIGNIFICANT CHANGE UP (ref 4.2–5.8)
RBC # FLD: 13.9 % — SIGNIFICANT CHANGE UP (ref 10.3–14.5)
SODIUM SERPL-SCNC: 141 MMOL/L — SIGNIFICANT CHANGE UP (ref 135–145)
TRIGL SERPL-MCNC: 72 MG/DL — SIGNIFICANT CHANGE UP
TSH SERPL-MCNC: 3.1 UIU/ML — SIGNIFICANT CHANGE UP (ref 0.27–4.2)
WBC # BLD: 11.86 K/UL — HIGH (ref 3.8–10.5)
WBC # FLD AUTO: 11.86 K/UL — HIGH (ref 3.8–10.5)

## 2022-03-25 RX ORDER — ATORVASTATIN CALCIUM 80 MG/1
1 TABLET, FILM COATED ORAL
Qty: 0 | Refills: 0 | DISCHARGE

## 2022-03-25 RX ORDER — ATORVASTATIN CALCIUM 80 MG/1
1 TABLET, FILM COATED ORAL
Qty: 30 | Refills: 0
Start: 2022-03-25 | End: 2022-04-23

## 2022-03-25 RX ORDER — CLOPIDOGREL BISULFATE 75 MG/1
1 TABLET, FILM COATED ORAL
Qty: 30 | Refills: 0
Start: 2022-03-25 | End: 2022-04-23

## 2022-03-25 RX ADMIN — AMLODIPINE BESYLATE 5 MILLIGRAM(S): 2.5 TABLET ORAL at 06:54

## 2022-03-25 RX ADMIN — Medication 2000 UNIT(S): at 11:38

## 2022-03-25 RX ADMIN — SODIUM CHLORIDE 3 MILLILITER(S): 9 INJECTION INTRAMUSCULAR; INTRAVENOUS; SUBCUTANEOUS at 12:39

## 2022-03-25 RX ADMIN — LOSARTAN POTASSIUM 100 MILLIGRAM(S): 100 TABLET, FILM COATED ORAL at 06:53

## 2022-03-25 RX ADMIN — CLOPIDOGREL BISULFATE 75 MILLIGRAM(S): 75 TABLET, FILM COATED ORAL at 11:39

## 2022-03-25 RX ADMIN — Medication 81 MILLIGRAM(S): at 11:38

## 2022-03-25 RX ADMIN — Medication 50 MILLIGRAM(S): at 06:54

## 2022-03-25 RX ADMIN — PANTOPRAZOLE SODIUM 40 MILLIGRAM(S): 20 TABLET, DELAYED RELEASE ORAL at 06:54

## 2022-03-25 RX ADMIN — SODIUM CHLORIDE 3 MILLILITER(S): 9 INJECTION INTRAMUSCULAR; INTRAVENOUS; SUBCUTANEOUS at 07:00

## 2022-03-25 NOTE — CONSULT NOTE ADULT - ASSESSMENT
66M with left calcaneal fracture   - Patient seen and evaluated   - Exam:  ankle ROM WNL, no tenderness on lateral calcaneal compression, pain on palpation to anterolateral calcaneal aspect, mild edema to lateral calcaneal aspect, no ecchymosis  no skin tenting, no acute signs of infection   - L foot CT: comminuted calcaneal fracture with significant impaction and comminution of the central to lateral posterior articular surface. Displace calc fx with CCJ involvement and nondisplaced fx of middle calc facet.   - Xrays and CT reviewed with patient and family   - Discussed with patient the nature of the left calcaneal fracture and will require surgical open reduction and internal fixation. Extensive discussion about the post-op course consisting of approx 8 weeks of non-weightbearing depending on serial x-rays and clinical course. Transition to non-weightbearing in a CAM boot, and then gradual transition to weight bearing. Pt and family expressed verbal understanding   - Reapplied posterior splint  - Instructed patient to remain non weight bearing to the left lower extremity with posterior splint and crutches   - Consent for surgery obtained   - Patient booked for surgery for next Thurs 3/31 with Dr. Sparrow  - Instructed patient to return on 3/30 to [facility] for medical/cardiac clearance for procedure   - Discussed with attending

## 2022-03-25 NOTE — DISCHARGE NOTE NURSING/CASE MANAGEMENT/SOCIAL WORK - NSDCPEFALRISK_GEN_ALL_CORE
For information on Fall & Injury Prevention, visit: [email or web address] OR  [email or web address] OR  https://www.cdc.gov/steadi/patient.html

## 2022-03-25 NOTE — DISCHARGE NOTE NURSING/CASE MANAGEMENT/SOCIAL WORK - NSDCFUADDAPPT_GEN_ALL_CORE_FT
Please follow up with your PCP within 1-2 weeks of discharge  Please follow up with your cardiologist within 1-2 weeks of discharge    Podiatry Discharge Instructions:  Follow up: Please follow up with Dr. Sparrow within 1 week of discharge from the hospital, please call [phone] for appointment and discuss that you recently were seen in the hospital.  Wound Care: Please leave your dressing clean dry intact until your follow up appointment.  Weight bearing: Please remain nonweight bearing to the left lower extremity in a posterior splint.

## 2022-03-25 NOTE — CONSULT NOTE ADULT - SUBJECTIVE AND OBJECTIVE BOX
Podiatry pager #: [phone]/ [phone]    Patient is a 66y old  Male who presents with a chief complaint of     HPI:  67 y/o M w/ PMH HTN, HLD, occasional PVCs and current Smoker presented to [place] ED complaining of left ankle pain. Patient was doing house work on a ladder 1 week ago when the ladder slipped and patient fell about 2-4 feet. Patient landed on his feet and sat down immediately after, denied any head strike, or loss of consciousness. Pt found to have left calcaneal comminuted fracture and podiatry consult was called. Podiatry is recommending ORIF and cardiology consult was called for pre-op clearance. Echocardiogram showed an inferolateral wall WMA and initial troponin is elevated at 3015. Pt admits to intermittent shortness of breath and chest pain at rest. Pt states that he develops chest pain during times of stress and he describes it was left sided with radiation to his jaw + left shoulder + left arm. Pt symptoms last for about 15 minutes and is self resolving. Pt's last episode of chest pain was 3 weeks ago but admits it has been happening for years. Pt is now transferred to [facility] for cardiac catheretization. (24 Mar 2022 17:04)      PAST MEDICAL & SURGICAL HISTORY:  HTN (hypertension)    HLD (hyperlipidemia)    PVC (premature ventricular contraction)    No significant past surgical history        MEDICATIONS  (STANDING):  amLODIPine   Tablet 5 milliGRAM(s) Oral daily  aspirin enteric coated 81 milliGRAM(s) Oral daily  atorvastatin 80 milliGRAM(s) Oral at bedtime  cholecalciferol 2000 Unit(s) Oral daily  clopidogrel Tablet 75 milliGRAM(s) Oral daily  losartan 100 milliGRAM(s) Oral daily  metoprolol succinate ER 50 milliGRAM(s) Oral daily  pantoprazole    Tablet 40 milliGRAM(s) Oral before breakfast  sodium chloride 0.9% lock flush 3 milliLiter(s) IV Push every 8 hours    MEDICATIONS  (PRN):      Allergies    No Known Allergies    Intolerances        VITALS:    Vital Signs Last 24 Hrs  T(C): 36.7 (25 Mar 2022 11:38), Max: 36.7 (25 Mar 2022 06:00)  T(F): 98.1 (25 Mar 2022 11:38), Max: 98.1 (25 Mar 2022 06:00)  HR: 78 (25 Mar 2022 11:38) (63 - 81)  BP: 101/62 (25 Mar 2022 11:38) (101/60 - 120/64)  BP(mean): --  RR: 20 (25 Mar 2022 11:38) (16 - 20)  SpO2: 99% (25 Mar 2022 11:38) (98% - 100%)    LABS:                          13.4   11.86 )-----------( 342      ( 25 Mar 2022 07:08 )             38.9       03-25    141  |  106  |  19  ----------------------------<  107<H>  4.1   |  20<L>  |  1.02    Ca    8.9      25 Mar 2022 07:08  Phos  3.6     03-25  Mg     2.20     03-25    TPro  7.0  /  Alb  3.7  /  TBili  0.6  /  DBili  x   /  AST  80<H>  /  ALT  25  /  AlkPhos  99  03-25      CAPILLARY BLOOD GLUCOSE          PT/INR - ( 24 Mar 2022 07:42 )   PT: 13.0 sec;   INR: 1.11 ratio         PTT - ( 24 Mar 2022 07:42 )  PTT:32.2 sec    LOWER EXTREMITY PHYSICAL EXAM:    Vasular: DP/PT 2/4, B/L, CFT <3 seconds B/L, Temperature gradient warm to cool , B/L.   Neuro: Epicritic sensation intact to the level of ankle, B/L.  Musculoskeletal/Ortho: ankle ROM WNL, no tenderness on lateral calcaneal compression, pain on palpation to anterolateral calcaneal aspect  Skin: mild edema to lateral calcaneal aspect, no skin tenting, no acute signs of infection     RADIOLOGY & ADDITIONAL STUDIES:    < from: CT Foot No Cont, Left (03.23.22 @ 16:55) >    ACC: [id number] EXAM:  CT 3D RECONSTRUCT [facility]                        ACC: [id number] EXAM:  CT FOOT ONLY LT                          PROCEDURE DATE:  03/23/2022          INTERPRETATION:  CT FOOT LEFT, CT 3D RECONSTRUCTION WO WORKSTATION    HISTORY:   Pain. Calcaneal fracture.    TECHNIQUE: Contiguous axial imaging was performed through the left foot.   Coronal and sagittal reformatting was utilized. 3-D reformatting was   performed.    COMPARISON:  Left foot and calcaneal x-rays from the same day.    FINDINGS:    OSSEOUS STRUCTURES    Acute Fractures:  Severely comminuted calcaneal fracture is again seen   involving the body and anterior process. There is significant impaction   and comminution of the central to lateral margin of the posterior   articular facet. Large central fragment is anteriorly rotated with the   impaction. There is flattening of Boehler's angle and widening of the   calcaneal body. Slightly displaced fractures extend into the   calcaneocuboid joint and nondisplaced fracture involves the middle   subtalar joint.    Chronic Fractures/Ossifications:  None.    Tarsal Coalition:  None.    Morphology:  Subcortical cystic change and bunion formation appears to   be present with osseous hypertrophy along the medialmargin of the 1st   metatarsal head.      JOINTS    Tibiotalar Joint:  Intact.    Subtalar Joints:  Otherwise relatively maintained given the calcaneal   fractures.    Intertarsal Joints:  Otherwise maintained given the calcaneal fractures.    Tarsometatarsal Joints:  Intact.    Metatarsophalangeal Joints:  There are small marginal osteophytes and   subchondral cysts at the 1st metatarsophalangeal and sesamoid joints.     Interphalangeal Joints:  Intact.    MYOTENDINOUS STRUCTURES  Intact within limits of CT. Small retrocalcaneal enthesophyte is noted.    OTHER SOFT TISSUES  Mild atherosclerotic calcifications are present.    IMPRESSION:  1. Severely comminuted calcaneal fracture with significant impaction and   comminution of the central to lateral margin of the posterior articular   facet.  2. There is slightly displaced calcaneal fracture involvement of the   calcaneocuboid joint and nondisplaced fracture involvement of the middle   calcaneal facet.    --- End of Report ---            MIRIAN MILLER MD; Attending Radiologist  This document has been electronically signed. Mar 23 2022  5:08PM    < end of copied text >   no

## 2022-03-25 NOTE — CHART NOTE - NSCHARTNOTEFT_GEN_A_CORE
Pt to remain non weight bearing with a posterior splint and crutches to the left lower extremity. Pt to f/u outpatient for Calcaneal ORIF with Dr. Sparrow.

## 2022-03-25 NOTE — DISCHARGE NOTE NURSING/CASE MANAGEMENT/SOCIAL WORK - PATIENT PORTAL LINK FT
You can access the FollowMyHealth Patient Portal offered by [facility] by registering at the following website: [email or web address]. By joining [employer]’s FollowMyHealth portal, you will also be able to view your health information using other applications (apps) compatible with our system.

## 2022-03-28 DIAGNOSIS — S92.002A UNSPECIFIED FRACTURE OF LEFT CALCANEUS, INITIAL ENCOUNTER FOR CLOSED FRACTURE: ICD-10-CM

## 2022-03-28 DIAGNOSIS — Z78.9 OTHER SPECIFIED HEALTH STATUS: ICD-10-CM

## 2022-03-28 DIAGNOSIS — F17.200 NICOTINE DEPENDENCE, UNSPECIFIED, UNCOMPLICATED: ICD-10-CM

## 2022-03-28 DIAGNOSIS — I49.3 VENTRICULAR PREMATURE DEPOLARIZATION: ICD-10-CM

## 2022-03-28 DIAGNOSIS — E78.5 HYPERLIPIDEMIA, UNSPECIFIED: ICD-10-CM

## 2022-03-28 PROBLEM — Z00.00 ENCOUNTER FOR PREVENTIVE HEALTH EXAMINATION: Status: ACTIVE | Noted: 2022-03-28

## 2022-03-28 PROBLEM — I10 ESSENTIAL (PRIMARY) HYPERTENSION: Chronic | Status: ACTIVE | Noted: 2022-03-23

## 2022-03-28 RX ORDER — IRBESARTAN 300 MG/1
300 TABLET ORAL DAILY
Refills: 0 | Status: ACTIVE | COMMUNITY

## 2022-03-28 RX ORDER — CHOLECALCIFEROL (VITAMIN D3) 25 MCG
TABLET ORAL
Refills: 0 | Status: ACTIVE | COMMUNITY

## 2022-03-28 RX ORDER — METOPROLOL SUCCINATE 50 MG/1
50 TABLET, EXTENDED RELEASE ORAL DAILY
Refills: 0 | Status: ACTIVE | COMMUNITY

## 2022-03-28 RX ORDER — OMEPRAZOLE 20 MG/1
20 CAPSULE, DELAYED RELEASE ORAL DAILY
Refills: 0 | Status: ACTIVE | COMMUNITY

## 2022-03-30 ENCOUNTER — INPATIENT (INPATIENT)
Facility: HOSPITAL | Age: 67
LOS: 0 days | Discharge: HOME | End: 2022-03-31
Attending: INTERNAL MEDICINE | Admitting: INTERNAL MEDICINE
Payer: MEDICARE

## 2022-03-30 VITALS
HEIGHT: 67 IN | RESPIRATION RATE: 16 BRPM | OXYGEN SATURATION: 100 % | DIASTOLIC BLOOD PRESSURE: 82 MMHG | HEART RATE: 83 BPM | SYSTOLIC BLOOD PRESSURE: 157 MMHG | TEMPERATURE: 97 F

## 2022-03-30 DIAGNOSIS — Z01.818 ENCOUNTER FOR OTHER PREPROCEDURAL EXAMINATION: ICD-10-CM

## 2022-03-30 DIAGNOSIS — S92.009A UNSPECIFIED FRACTURE OF UNSPECIFIED CALCANEUS, INITIAL ENCOUNTER FOR CLOSED FRACTURE: ICD-10-CM

## 2022-03-30 LAB
ALBUMIN SERPL ELPH-MCNC: 4.2 G/DL — SIGNIFICANT CHANGE UP (ref 3.3–5)
ALP SERPL-CCNC: 116 U/L — SIGNIFICANT CHANGE UP (ref 40–120)
ALT FLD-CCNC: 17 U/L — SIGNIFICANT CHANGE UP (ref 4–41)
ANION GAP SERPL CALC-SCNC: 12 MMOL/L — SIGNIFICANT CHANGE UP (ref 7–14)
APTT BLD: 31.7 SEC — SIGNIFICANT CHANGE UP (ref 27–36.3)
AST SERPL-CCNC: 19 U/L — SIGNIFICANT CHANGE UP (ref 4–40)
B PERT DNA SPEC QL NAA+PROBE: SIGNIFICANT CHANGE UP
B PERT+PARAPERT DNA PNL SPEC NAA+PROBE: SIGNIFICANT CHANGE UP
BASOPHILS # BLD AUTO: 0.07 K/UL — SIGNIFICANT CHANGE UP (ref 0–0.2)
BASOPHILS NFR BLD AUTO: 0.8 % — SIGNIFICANT CHANGE UP (ref 0–2)
BILIRUB SERPL-MCNC: 0.5 MG/DL — SIGNIFICANT CHANGE UP (ref 0.2–1.2)
BLD GP AB SCN SERPL QL: NEGATIVE — SIGNIFICANT CHANGE UP
BORDETELLA PARAPERTUSSIS (RAPRVP): SIGNIFICANT CHANGE UP
BUN SERPL-MCNC: 15 MG/DL — SIGNIFICANT CHANGE UP (ref 7–23)
C PNEUM DNA SPEC QL NAA+PROBE: SIGNIFICANT CHANGE UP
CALCIUM SERPL-MCNC: 9.3 MG/DL — SIGNIFICANT CHANGE UP (ref 8.4–10.5)
CHLORIDE SERPL-SCNC: 102 MMOL/L — SIGNIFICANT CHANGE UP (ref 98–107)
CO2 SERPL-SCNC: 25 MMOL/L — SIGNIFICANT CHANGE UP (ref 22–31)
CREAT SERPL-MCNC: 0.89 MG/DL — SIGNIFICANT CHANGE UP (ref 0.5–1.3)
EGFR: 95 ML/MIN/1.73M2 — SIGNIFICANT CHANGE UP
EOSINOPHIL # BLD AUTO: 0.07 K/UL — SIGNIFICANT CHANGE UP (ref 0–0.5)
EOSINOPHIL NFR BLD AUTO: 0.8 % — SIGNIFICANT CHANGE UP (ref 0–6)
FLUAV SUBTYP SPEC NAA+PROBE: SIGNIFICANT CHANGE UP
FLUBV RNA SPEC QL NAA+PROBE: SIGNIFICANT CHANGE UP
GLUCOSE SERPL-MCNC: 117 MG/DL — HIGH (ref 70–99)
HADV DNA SPEC QL NAA+PROBE: SIGNIFICANT CHANGE UP
HCOV 229E RNA SPEC QL NAA+PROBE: SIGNIFICANT CHANGE UP
HCOV HKU1 RNA SPEC QL NAA+PROBE: SIGNIFICANT CHANGE UP
HCOV NL63 RNA SPEC QL NAA+PROBE: SIGNIFICANT CHANGE UP
HCOV OC43 RNA SPEC QL NAA+PROBE: SIGNIFICANT CHANGE UP
HCT VFR BLD CALC: 39.1 % — SIGNIFICANT CHANGE UP (ref 39–50)
HGB BLD-MCNC: 13 G/DL — SIGNIFICANT CHANGE UP (ref 13–17)
HMPV RNA SPEC QL NAA+PROBE: SIGNIFICANT CHANGE UP
HPIV1 RNA SPEC QL NAA+PROBE: SIGNIFICANT CHANGE UP
HPIV2 RNA SPEC QL NAA+PROBE: SIGNIFICANT CHANGE UP
HPIV3 RNA SPEC QL NAA+PROBE: SIGNIFICANT CHANGE UP
HPIV4 RNA SPEC QL NAA+PROBE: SIGNIFICANT CHANGE UP
IANC: 6.17 K/UL — SIGNIFICANT CHANGE UP (ref 1.8–7.4)
IMM GRANULOCYTES NFR BLD AUTO: 0.5 % — SIGNIFICANT CHANGE UP (ref 0–1.5)
INR BLD: 1.14 RATIO — SIGNIFICANT CHANGE UP (ref 0.88–1.16)
LYMPHOCYTES # BLD AUTO: 1.26 K/UL — SIGNIFICANT CHANGE UP (ref 1–3.3)
LYMPHOCYTES # BLD AUTO: 15.3 % — SIGNIFICANT CHANGE UP (ref 13–44)
M PNEUMO DNA SPEC QL NAA+PROBE: SIGNIFICANT CHANGE UP
MCHC RBC-ENTMCNC: 29 PG — SIGNIFICANT CHANGE UP (ref 27–34)
MCHC RBC-ENTMCNC: 33.2 GM/DL — SIGNIFICANT CHANGE UP (ref 32–36)
MCV RBC AUTO: 87.3 FL — SIGNIFICANT CHANGE UP (ref 80–100)
MONOCYTES # BLD AUTO: 0.65 K/UL — SIGNIFICANT CHANGE UP (ref 0–0.9)
MONOCYTES NFR BLD AUTO: 7.9 % — SIGNIFICANT CHANGE UP (ref 2–14)
NEUTROPHILS # BLD AUTO: 6.17 K/UL — SIGNIFICANT CHANGE UP (ref 1.8–7.4)
NEUTROPHILS NFR BLD AUTO: 74.7 % — SIGNIFICANT CHANGE UP (ref 43–77)
NRBC # BLD: 0 /100 WBCS — SIGNIFICANT CHANGE UP
NRBC # FLD: 0 K/UL — SIGNIFICANT CHANGE UP
PLATELET # BLD AUTO: 443 K/UL — HIGH (ref 150–400)
POTASSIUM SERPL-MCNC: 4.3 MMOL/L — SIGNIFICANT CHANGE UP (ref 3.5–5.3)
POTASSIUM SERPL-SCNC: 4.3 MMOL/L — SIGNIFICANT CHANGE UP (ref 3.5–5.3)
PROT SERPL-MCNC: 7.5 G/DL — SIGNIFICANT CHANGE UP (ref 6–8.3)
PROTHROM AB SERPL-ACNC: 13.3 SEC — SIGNIFICANT CHANGE UP (ref 10.5–13.4)
RAPID RVP RESULT: SIGNIFICANT CHANGE UP
RBC # BLD: 4.48 M/UL — SIGNIFICANT CHANGE UP (ref 4.2–5.8)
RBC # FLD: 14 % — SIGNIFICANT CHANGE UP (ref 10.3–14.5)
RH IG SCN BLD-IMP: POSITIVE — SIGNIFICANT CHANGE UP
RSV RNA SPEC QL NAA+PROBE: SIGNIFICANT CHANGE UP
RV+EV RNA SPEC QL NAA+PROBE: SIGNIFICANT CHANGE UP
SARS-COV-2 RNA SPEC QL NAA+PROBE: SIGNIFICANT CHANGE UP
SODIUM SERPL-SCNC: 139 MMOL/L — SIGNIFICANT CHANGE UP (ref 135–145)
TROPONIN T, HIGH SENSITIVITY RESULT: 400 NG/L — CRITICAL HIGH
TROPONIN T, HIGH SENSITIVITY RESULT: 474 NG/L — CRITICAL HIGH
TROPONIN T, HIGH SENSITIVITY RESULT: 498 NG/L — CRITICAL HIGH
WBC # BLD: 8.26 K/UL — SIGNIFICANT CHANGE UP (ref 3.8–10.5)
WBC # FLD AUTO: 8.26 K/UL — SIGNIFICANT CHANGE UP (ref 3.8–10.5)

## 2022-03-30 PROCEDURE — 99285 EMERGENCY DEPT VISIT HI MDM: CPT

## 2022-03-30 PROCEDURE — 99223 1ST HOSP IP/OBS HIGH 75: CPT

## 2022-03-30 PROCEDURE — 71046 X-RAY EXAM CHEST 2 VIEWS: CPT | Mod: 26

## 2022-03-30 PROCEDURE — 73630 X-RAY EXAM OF FOOT: CPT | Mod: 26,LT

## 2022-03-30 RX ORDER — METOPROLOL TARTRATE 50 MG
50 TABLET ORAL DAILY
Refills: 0 | Status: DISCONTINUED | OUTPATIENT
Start: 2022-03-30 | End: 2022-03-31

## 2022-03-30 RX ORDER — CLOPIDOGREL BISULFATE 75 MG/1
75 TABLET, FILM COATED ORAL DAILY
Refills: 0 | Status: DISCONTINUED | OUTPATIENT
Start: 2022-03-30 | End: 2022-03-31

## 2022-03-30 RX ORDER — ACETAMINOPHEN 500 MG
650 TABLET ORAL EVERY 6 HOURS
Refills: 0 | Status: DISCONTINUED | OUTPATIENT
Start: 2022-03-30 | End: 2022-03-31

## 2022-03-30 RX ORDER — AMLODIPINE BESYLATE 2.5 MG/1
5 TABLET ORAL DAILY
Refills: 0 | Status: DISCONTINUED | OUTPATIENT
Start: 2022-03-30 | End: 2022-03-31

## 2022-03-30 RX ORDER — ATORVASTATIN CALCIUM 80 MG/1
80 TABLET, FILM COATED ORAL AT BEDTIME
Refills: 0 | Status: DISCONTINUED | OUTPATIENT
Start: 2022-03-30 | End: 2022-03-31

## 2022-03-30 RX ORDER — PANTOPRAZOLE SODIUM 20 MG/1
40 TABLET, DELAYED RELEASE ORAL
Refills: 0 | Status: DISCONTINUED | OUTPATIENT
Start: 2022-03-30 | End: 2022-03-31

## 2022-03-30 RX ORDER — ONDANSETRON 8 MG/1
4 TABLET, FILM COATED ORAL EVERY 8 HOURS
Refills: 0 | Status: DISCONTINUED | OUTPATIENT
Start: 2022-03-30 | End: 2022-03-31

## 2022-03-30 RX ORDER — ENOXAPARIN SODIUM 100 MG/ML
40 INJECTION SUBCUTANEOUS EVERY 24 HOURS
Refills: 0 | Status: DISCONTINUED | OUTPATIENT
Start: 2022-03-30 | End: 2022-03-31

## 2022-03-30 RX ORDER — LANOLIN ALCOHOL/MO/W.PET/CERES
3 CREAM (GRAM) TOPICAL AT BEDTIME
Refills: 0 | Status: DISCONTINUED | OUTPATIENT
Start: 2022-03-30 | End: 2022-03-31

## 2022-03-30 RX ORDER — SODIUM CHLORIDE 9 MG/ML
1000 INJECTION INTRAMUSCULAR; INTRAVENOUS; SUBCUTANEOUS
Refills: 0 | Status: DISCONTINUED | OUTPATIENT
Start: 2022-03-30 | End: 2022-03-31

## 2022-03-30 RX ORDER — LOSARTAN POTASSIUM 100 MG/1
100 TABLET, FILM COATED ORAL DAILY
Refills: 0 | Status: DISCONTINUED | OUTPATIENT
Start: 2022-03-30 | End: 2022-03-31

## 2022-03-30 RX ORDER — ASPIRIN/CALCIUM CARB/MAGNESIUM 324 MG
81 TABLET ORAL DAILY
Refills: 0 | Status: DISCONTINUED | OUTPATIENT
Start: 2022-03-30 | End: 2022-03-31

## 2022-03-30 RX ADMIN — SODIUM CHLORIDE 75 MILLILITER(S): 9 INJECTION INTRAMUSCULAR; INTRAVENOUS; SUBCUTANEOUS at 17:21

## 2022-03-30 RX ADMIN — SODIUM CHLORIDE 75 MILLILITER(S): 9 INJECTION INTRAMUSCULAR; INTRAVENOUS; SUBCUTANEOUS at 21:09

## 2022-03-30 RX ADMIN — ATORVASTATIN CALCIUM 80 MILLIGRAM(S): 80 TABLET, FILM COATED ORAL at 21:09

## 2022-03-30 NOTE — CONSULT NOTE ADULT - PROBLEM SELECTOR RECOMMENDATION 9
Medical optimization  - Patient denies any current chest pain, palpitations, and SOB.    - Patient has history of HTN, PVCs, CAD s/p PCI with NESHA 3/24, High general cardiovascular risk.  - Good METs at baseline (4+): patient is able to ambulate unassisted, denies need to rest when walking on flat ground and upstairs.  - Patient takes plavix 75mg, ASA 81mg for recent NESHA placement. Also on atorvastatin 80mg for HLD, amlodipine 5mg/irbesartan 300mg/metoprolol 50mg for HTN. Omeprazole for GERD.  - EKG: normal sinus rhythm, inferior ischemic changes consistent with prior EKG 3/24 and known history of CAD s/p PCI.    This is a HIGH risk patient being evaluated for a LOW to INTERMEDIATE risk procedure.     These risk estimates should be incorporated into a shared decision making conversation between the patient or their surrogate and the performing practitioner regarding the risk, benefits, and alternatives to the procedure and whether to proceed. Additional cardiac testing is unlikely to change the risk assessment or procedural outcomes from a cardiac perspective.    Of note, patient must remain on dual antiplatelet therapy (i.e. aspirin and clopidogrel) due to recent PCI for ACS. Discontinuing dual antiplatelet therapy in the setting of recent stent placement carries high risk of stent thrombosis and myocardial infarction. Recommend shared decision making with patient/family, surgeon and anesthesiologist regarding optimal procedural planning.

## 2022-03-30 NOTE — ED PROVIDER NOTE - ATTENDING CONTRIBUTION TO CARE
I (Bernie) agree with above, I performed a history and physical. Counseled [facility] medical staff, physician assistant, and/or medical student on medical decision making as documented. Medical decisions and treatment interventions were made in real time during the patient encounter. Additionally and/or with the following exceptions: The patient presented to the ED with known callaneal fracture requiring orif. Was a mechanical fall, did not hit head, no AC. Patient is not requesting pain meds at this time. Podiatry already aware of patient and will accept admission. No acute medical complaints, but did have a stent placed last friday.    The patient's condition was not amenable to outpatient treatment due either the lack of feasibility of outpatient care coordination, possibility for further decompensation with adverse outcome if discharge, or treatments and diagnostic  modalities only available during an inpatient hospitalization.

## 2022-03-30 NOTE — CONSULT NOTE ADULT - SUBJECTIVE AND OBJECTIVE BOX
Patient seen and evaluated at bedside. Son present and translating per pt request.     Consult question: Medically optimized prior to surgery 3/31?    HPI:  65 y/o male PMH HTN, CAD sent in by podiatrist Dr. Corley for surgery tomorrow. Pt was dx w/ a L calcaneal fracture last week and is scheduled for left calcaneal fracture ORIF 3/31 at [facility] Pt admits to mild pain to L foot, resolved episode of chest pain four days prior to presentation. Denies current chest pain, dyspnea, abd pain, n/v/d, weakness, dizziness, syncope, fever or chills.    PMH:   HTN (hypertension)    HLD (hyperlipidemia)    PVC (premature ventricular contraction)      PSH:   PCI with NESHA placement in LCX 3/24/2022      Medications:   acetaminophen     Tablet .. 650 milliGRAM(s) Oral every 6 hours PRN  amLODIPine   Tablet 5 milliGRAM(s) Oral daily  aspirin enteric coated 81 milliGRAM(s) Oral daily  atorvastatin 80 milliGRAM(s) Oral at bedtime  clopidogrel Tablet 75 milliGRAM(s) Oral daily  enoxaparin Injectable 40 milliGRAM(s) SubCutaneous every 24 hours  losartan 100 milliGRAM(s) Oral daily  melatonin 3 milliGRAM(s) Oral at bedtime PRN  metoprolol succinate ER 50 milliGRAM(s) Oral daily  ondansetron Injectable 4 milliGRAM(s) IV Push every 8 hours PRN  pantoprazole    Tablet 40 milliGRAM(s) Oral before breakfast  sodium chloride 0.9%. 1000 milliLiter(s) IV Continuous <Continuous>    Allergies:  No Known Allergies    FAMILY HISTORY:  FH: HTN (hypertension) (Father),  in 70s from cardiac event (?MI)      Social History:  Smoking: Active smoker, ? PPD   Alcohol: Denies  Drugs: Denies    Review of Systems: Per HPI, otherwise negative.      Vitals:  T(C): 36.4 (22 @ 12:25), Max: 36.4 (22 @ 12:25)  HR: 68 (22 @ 12:25) (68 - 83)  BP: 121/74 (22 @ 12:25) (121/74 - 157/82)  RR: 16 (22 @ 12:25) (16 - 16)  SpO2: 98% (22 @ 12:25) (98% - 100%)      Physical Exam:  Appearance: NAD  Eyes: PERRL, EOMI  HENT: Normal oral [place], NC/AT  Cardiovascular: normal S1 and S2, RRR, no m/r/g, no edema, normal JVP  Respiratory: Clear to auscultation bilaterally, no crackles/wheezes  Gastrointestinal: Soft, non-tender, non-distended, BS+  Musculoskeletal: No clubbing  Ext: Left foot soft cast  Neurologic: Non-focal, moving all extremities  Lymphatic: No lymphadenopathy  Psychiatry: AAOx3, mood & affect appropriate  Skin: No rashes, no ecchymoses, no cyanosis    Cardiovascular Diagnostic Testing:  ECG: NSR, signs of inferior infarct consistent with known CAD, unchanged from 3/24    Echo: TTE 3/24 with EF 40-45%, inferior/inferolateral/inferoseptal hypokinesis, mild-moderate systolic dysfunction    Cath: PCI 3/24 with 99% L circ occlusion s/p NESHA placement. RCA 70%, RPDA 100%     Imaging: CXR with cardiomegaly, no pulmonary edema/pleural effusion      Labs:                        13.0   8.26  )-----------( 443      ( 30 Mar 2022 12:02 )             39.1         139  |  102  |  15  ----------------------------<  117<H>  4.3   |  25  |  0.89    Ca    9.3      30 Mar 2022 12:02    TPro  7.5  /  Alb  4.2  /  TBili  0.5  /  DBili  x   /  AST  19  /  ALT  17  /  AlkPhos  116      PT/INR - ( 30 Mar 2022 12:02 )   PT: 13.3 sec;   INR: 1.14 ratio         PTT - ( 30 Mar 2022 12:02 )  PTT:31.7 sec            Thyroid Stimulating Hormone, Serum: 3.10 uIU/mL ( @ 07:08)  
Podiatry pager #: [phone]/ [phone]    Patient is a 66y old  Male who presents with a chief complaint of left foot injury    HPI: 67 y/o male pmh htn, CAD sent in by podiatrist Dr. Corley for surgery tomorrow. Pt was dx w/ a L calcaneal fracture last week and is scheduled for surgery tomorrow at [facility]. Pt admits to mild pain to L foot. Denies chest pain, sob, abd pain, n/v/d, numbness, tingling, weakness, dizziness, syncope, fever or chills.      PAST MEDICAL & SURGICAL HISTORY:  HTN (hypertension)    HLD (hyperlipidemia)    PVC (premature ventricular contraction)    No significant past surgical history        MEDICATIONS  (STANDING):    MEDICATIONS  (PRN):      Allergies    No Known Allergies    Intolerances        VITALS:    Vital Signs Last 24 Hrs  T(C): 36.1 (30 Mar 2022 10:53), Max: 36.1 (30 Mar 2022 10:53)  T(F): 97 (30 Mar 2022 10:53), Max: 97 (30 Mar 2022 10:53)  HR: 83 (30 Mar 2022 10:53) (83 - 83)  BP: 157/82 (30 Mar 2022 10:53) (157/82 - 157/82)  BP(mean): --  RR: 16 (30 Mar 2022 10:53) (16 - 16)  SpO2: 100% (30 Mar 2022 10:53) (100% - 100%)    LABS:                CAPILLARY BLOOD GLUCOSE              LOWER EXTREMITY PHYSICAL EXAM:  Vasular: DP/PT 2/4, B/L, CFT <3 seconds B/L, Temperature gradient warm to cool , B/L.   Neuro: Epicritic sensation intact to the level of ankle, B/L.  Musculoskeletal/Ortho: ankle ROM WNL, no tenderness on lateral calcaneal compression, pain on palpation to anterolateral calcaneal aspect  Skin: mild edema to lateral calcaneal aspect, no skin tenting, no acute signs of infection     RADIOLOGY & ADDITIONAL STUDIES:  < from: CT Foot No Cont, Left (03.23.22 @ 16:55) >    ACC: [id number] EXAM:  CT 3D RECONSTRUCT [facility]                        ACC: [id number] EXAM:  CT FOOT ONLY LT                          PROCEDURE DATE:  03/23/2022          INTERPRETATION:  CT FOOT LEFT, CT 3D RECONSTRUCTION WO WORKSTATION    HISTORY:   Pain. Calcaneal fracture.    TECHNIQUE: Contiguous axial imaging was performed through the left foot.   Coronal and sagittal reformatting was utilized. 3-D reformatting was   performed.    COMPARISON:  Left foot and calcaneal x-rays from the same day.    FINDINGS:    OSSEOUS STRUCTURES    Acute Fractures:  Severely comminuted calcaneal fracture is again seen   involving the body and anterior process. There is significant impaction   and comminution of the central to lateral margin of the posterior   articular facet. Large central fragment is anteriorly rotated with the   impaction. There is flattening of Boehler's angle and widening of the   calcaneal body. Slightly displaced fractures extend into the   calcaneocuboid joint and nondisplaced fracture involves the middle   subtalar joint.    Chronic Fractures/Ossifications:  None.    Tarsal Coalition:  None.    Morphology:  Subcortical cystic change and bunion formation appears to   be present with osseous hypertrophy along the medialmargin of the 1st   metatarsal head.      JOINTS    Tibiotalar Joint:  Intact.    Subtalar Joints:  Otherwise relatively maintained given the calcaneal   fractures.    Intertarsal Joints:  Otherwise maintained given the calcaneal fractures.    Tarsometatarsal Joints:  Intact.    Metatarsophalangeal Joints:  There are small marginal osteophytes and   subchondral cysts at the 1st metatarsophalangeal and sesamoid joints.     Interphalangeal Joints:  Intact.    MYOTENDINOUS STRUCTURES  Intact within limits of CT. Small retrocalcaneal enthesophyte is noted.    OTHER SOFT TISSUES  Mild atherosclerotic calcifications are present.    IMPRESSION:  1. Severely comminuted calcaneal fracture with significant impaction and   comminution of the central to lateral margin of the posterior articular   facet.  2. There is slightly displaced calcaneal fracture involvement of the   calcaneocuboid joint and nondisplaced fracture involvement of the middle   calcaneal facet.    --- End of Report ---            MIRIAN MILLER MD; Attending Radiologist  This document has been electronically signed. Mar 23 2022  5:08PM    < end of copied text >

## 2022-03-30 NOTE — H&P ADULT - NSHPPHYSICALEXAM_GEN_ALL_CORE
Vital Signs Last 24 Hrs  T(C): 36.4 (30 Mar 2022 12:25), Max: 36.4 (30 Mar 2022 12:25)  T(F): 97.5 (30 Mar 2022 12:25), Max: 97.5 (30 Mar 2022 12:25)  HR: 68 (30 Mar 2022 12:25) (68 - 83)  BP: 121/74 (30 Mar 2022 12:25) (121/74 - 157/82)  BP(mean): --  RR: 16 (30 Mar 2022 12:25) (16 - 16)  SpO2: 98% (30 Mar 2022 12:25) (98% - 100%)  I&O's Summary      PHYSICAL EXAM:  GENERAL: Alert, awake, orientedx3 in no distress  HEENT: TO, EOMI, neck supple, no JVP, no carotid bruits, no palpable thyromegaly or lymphadenopathy, oral mucosa moist and pink  CHEST/LUNG: Bilateral clear breath sounds, no rales, no crepitations, no rhonchi, no wheezing  HEART: Regular rate and rhythm, SAPNA I/VI at LPSB, no rub, no gallops  ABDOMEN: Soft, nontender, nondistended; bowel sounds present, no guarding, no rigidity, no rebound tenderness.  : No flank or supra pubic tenderness.  Neuro: AAOx3, no focal deficit, motor and sensory systems are grossly intact. Cranial nerves II to XII grossly intact. Able to speak and move all 4 extremities.  EXTREMITIES: Bilateral distal peripheral pulses are palpable, no calf tenderness, no cyanosis, no clubbing, no edema  SKIN: No rash or skin lesion  Musculoskeletal: No joint pain or joint swelling. Vital Signs Last 24 Hrs  T(C): 36.4 (30 Mar 2022 12:25), Max: 36.4 (30 Mar 2022 12:25)  T(F): 97.5 (30 Mar 2022 12:25), Max: 97.5 (30 Mar 2022 12:25)  HR: 68 (30 Mar 2022 12:25) (68 - 83)  BP: 121/74 (30 Mar 2022 12:25) (121/74 - 157/82)  BP(mean): --  RR: 16 (30 Mar 2022 12:25) (16 - 16)  SpO2: 98% (30 Mar 2022 12:25) (98% - 100%)  I&O's Summary      PHYSICAL EXAM:  GENERAL: Alert, awake, orientedx3 in no distress  HEENT: TO, EOMI, neck supple, no JVP  CHEST/LUNG: Bilateral clear breath sounds, no rales, no crepitations, no rhonchi, no wheezing  HEART: Regular rate and rhythm, SAPNA I/VI at LPSB, no rub, no gallops  ABDOMEN: Soft, nontender, nondistended; bowel sounds present, no guarding, no rigidity, no rebound tenderness.  : No flank or supra pubic tenderness.  Neuro: AAOx3, no focal deficit, motor and sensory systems are grossly intact  EXTREMITIES: Bilateral distal peripheral pulses are palpable, no calf tenderness, Left foot cast noted with dressing  SKIN: No rash or skin lesion  Musculoskeletal: No joint pain or joint swelling.

## 2022-03-30 NOTE — CONSULT NOTE ADULT - ASSESSMENT
66M with left calcaneal fracture   - Patient seen and evaluated w/ relative bedside for translation  - Exam:  ankle ROM WNL, no tenderness on lateral calcaneal compression, pain on palpation to anterolateral calcaneal aspect, mild edema to lateral calcaneal aspect, no ecchymosis  no skin tenting, no acute signs of infection   - L foot CT: comminuted calcaneal fracture with significant impaction and comminution of the central to lateral posterior articular surface. Displace calc fx with CCJ involvement and nondisplaced fx of middle calc facet.   - Pt is scheduled for L calcaneus ORIF tomorrow 3/31  @230pm   - Witnessed consent obtained  - Will need cardiac clearance prior to procedure, pt is s/p cardiac cath w/ stent 3/24  - Rec admit to medicine Dr. Andre Greene  - Please document medical clearance for L foot surgery under general anesthesia  -  NPO after midnight, AM labs, type & screen, coags, CXR, ECG  - Covid swab  - Discussed with attending   
66M PMH HTN, CAD POD6 from PCI with NESHA placement in left circumflex, on DAPT, presenting for ORIF of left calcaneal fracture. Cardiology consulted for pre-operative evaluation of cardiovascular stability.

## 2022-03-30 NOTE — H&P ADULT - NSHPLABSRESULTS_GEN_ALL_CORE
LABS:                        13.0   8.26  )-----------( 443      ( 30 Mar 2022 12:02 )             39.1     03-30    139  |  102  |  15  ----------------------------<  117<H>  4.3   |  25  |  0.89    Ca    9.3      30 Mar 2022 12:02    TPro  7.5  /  Alb  4.2  /  TBili  0.5  /  DBili  x   /  AST  19  /  ALT  17  /  AlkPhos  116  03-30    PT/INR - ( 30 Mar 2022 12:02 )   PT: 13.3 sec;   INR: 1.14 ratio         PTT - ( 30 Mar 2022 12:02 )  PTT:31.7 sec  CAPILLARY BLOOD GLUCOSE      RADIOLOGY & ADDITIONAL TESTS:  < from: Xray Foot AP + Lateral + Oblique, Left (03.30.22 @ 12:14) >      ACC: [id number] EXAM:  XR FOOT COMP MIN 3 VIEWS LT                          PROCEDURE DATE:  03/30/2022          INTERPRETATION:  INDICATION: Left calcaneus fracture.    TECHNIQUE: 3 views of the left foot    COMPARISON: 3/23/2022    IMPRESSION: Again noted is a comminuted fracture in the left calcaneus,   not significantly changed in appearance. Overlying splint is in place.    --- End of Report ---            REINA JACOBS MD; Attending Radiologist  This document has been electronically signed. Mar 30 2022  1:22PM    < end of copied text >    < from: Xray Chest 2 Views PA/Lat (03.30.22 @ 12:13) >      ACC: [id number] EXAM:  XR CHEST PA LAT 2V                          PROCEDURE DATE:  03/30/2022          INTERPRETATION:  HISTORY: Rule out pneumonia. No other clinical history   is provided.    TECHNIQUE: PA and lateral views of the chest were obtained.    COMPARISON: 3/23/2022    FINDINGS:  The cardiac silhouette is enlarged. There are no focal   consolidations or pleural effusions. The hilar and mediastinal structures   appear unremarkable. The osseous structures are intact.    IMPRESSION: Cardiomegaly. Clear lungs.    --- End of Report ---            REINA JACOBS MD; Attending Radiologist  This document has been electronically signed. Mar 30 2022 12:57PM    < end of copied text >      EKG findings reviewed.    Imaging Personally Reviewed:  [x] YES  [ ] NO    Consultant(s) Notes Reviewed:  [x] YES  [ ] NO    Care Discussed with Consultants/Other Providers [x] YES  [ ] NO

## 2022-03-30 NOTE — H&P ADULT - ASSESSMENT
67 y/o male Pmh htn, CAD sent in by podiatrist Dr. Corley for surgery tomorrow. Pt was dx w/ a L calcaneal fracture last week and is scheduled for surgery tomorrow at [facility]. Pt admits to mild pain to L foot. Denies chest pain, sob, abd pain, n/v/d, numbness, tingling, weakness, dizziness, syncope, fever or chills.    Assessment:  1) Acute Left foot #  2) HTN  3) HLD  4) CAD s/p PCI 1 week ago with NSTEMI with persistent elevated troponin  5) GERD    Plan or care:  Admit to telemetry  Serial CK, troponin, EKG,  Echocardiogram results reviewed  Cardiology evaluation for cardiac clearances  Optimal BP Control  ASAEC, Plavix, statin, ACEi, betablocker, Caclcium blocker  DVT/GI prophyaxis  Optimal pain control  For possible OR  Patient is at high risk for cardio-pulmonary complication for schedule low to intermediate risk foot/podiatry surgery.   Will follow 65 y/o male Pmh htn, CAD sent in by podiatrist Dr. Corley for surgery tomorrow. Pt was dx w/ a L calcaneal fracture last week and is scheduled for surgery tomorrow at [facility]. Pt admits to mild pain to L foot. Denies chest pain, sob, abd pain, n/v/d, numbness, tingling, weakness, dizziness, syncope, fever or chills.    Assessment:  1) Acute Left foot #  2) HTN  3) HLD  4) CAD s/p PCI 1 week ago with NSTEMI with persistent elevated troponin likely resolving ACS  5) GERD  6) Chronic smoker    Plan or care:  Admit to telemetry  Serial CK, troponin, EKG,  Echocardiogram and cardiac cath results reviewed  Cardiology evaluation for cardiac clearances  Optimal BP Control  ASAEC, Plavix, statin, ACEi, betablocker, Caclcium blocker  DVT/GI prophylaxis  Optimal pain control  For possible OR tomorrow  NPO past midnight  Patient is at high risk for cardio-pulmonary complication for schedule low to intermediate risk foot/podiatry surgery. You can proceed with surgery pending cardiac recs.   Risk, befits and alternative discussed with patient and son at bedside.  Will follow

## 2022-03-30 NOTE — H&P ADULT - HISTORY OF PRESENT ILLNESS
65 y/o male pmh htn, CAD sent in by podiatrist Dr. Corley for surgery tomorrow. Pt was dx w/ a L calcaneal fracture last week and is scheduled for surgery tomorrow at [facility]. Pt admits to mild pain to L foot. Denies chest pain, sob, abd pain, n/v/d, numbness, tingling, weakness, dizziness, syncope, fever or chills.

## 2022-03-30 NOTE — ED ADULT NURSE REASSESSMENT NOTE - NS ED NURSE REASSESS COMMENT FT1
patient has new iv inserted. MD at bedside to admit patient. Patient is resting , no chest pain sob n./v.

## 2022-03-30 NOTE — H&P ADULT - NSHPREVIEWOFSYSTEMS_GEN_ALL_CORE
REVIEW OF SYSTEMS:  CONSTITUTIONAL: No fever or chills  HEENT: No headache,, visual disturbances, ear pain or discharge  RESPIRATORY: No shortness of breath, cough, wheezing or hemoptysis  CARDIOVASCULAR: No Chest pain or SOB  GASTROINTESTINAL: No abdominal pain, nausea, vomiting, diarrhea, hematemesis or blood per rectum.  GENITOURINARY: No flank or supra pubic pain  NEUROLOGICAL: No headache, focal limb weakness, tingling or numbness sensation   SKIN: No rash or skin lesion  MUSCULOSKELETAL: No leg pain, calf pain or swelling  Psych: Denies suicidal or homicidal ideation  Hematology: No bleeding or bruising REVIEW OF SYSTEMS:  CONSTITUTIONAL: No fever or chills  HEENT: No headache,, visual disturbances, ear pain or discharge  RESPIRATORY: No shortness of breath, cough, wheezing or hemoptysis  CARDIOVASCULAR: No Chest pain or SOB  GASTROINTESTINAL: No abdominal pain, nausea, vomiting, diarrhea, hematemesis or blood per rectum.  GENITOURINARY: No flank or supra pubic pain  NEUROLOGICAL: No headache, focal limb weakness, tingling or numbness sensation   SKIN: No rash or skin lesion  MUSCULOSKELETAL: Left foot pain  Psych: Denies suicidal or homicidal ideation  Hematology: No bleeding or bruising

## 2022-03-30 NOTE — PATIENT PROFILE ADULT - FALL HARM RISK - HARM RISK INTERVENTIONS

## 2022-03-30 NOTE — CONSULT NOTE ADULT - ATTENDING COMMENTS
Personally saw and examined patient  labs and vitals reviewed  agree with above assessment and plan  66M w CAD/PCI with NESHA placement in left circumflex one week ago, on DAPT, presenting for ORIF of left calcaneal fracture. Cardiology consulted for pre-operative evaluation of cardiovascular stability.  pt with recent PCI, can not d/c dapt. must remain on dapt for procedure  pt is grossly euvolemic, no known hx of VT/VF/SCD  no murmurs on exam  denies cp or sob  comfortable appearing  given recent PCI, pt is high risk for any procedure.   d/w surgical team, procedure is urgent and therefore not further cardiac testing is indicated and pt may proceed  consider anesthesia pre-procedure eval.   must remain on uninterrupted dapt for procedure  will follow with you

## 2022-03-30 NOTE — ED PROVIDER NOTE - OBJECTIVE STATEMENT
67 y/o male pmh htn, CAD sent in by podiatrist Dr. Corley for surgery tomorrow. Pt was dx w/ a calcaneal fracture last week and was scheduled for surgery today. Pt admits to mild pain to R foot. Denies chest pain, sob, abd pain, n/v/d, numbness, tingling, weakness, dizziness, syncope, fever or chills.

## 2022-03-30 NOTE — ED ADULT NURSE NOTE - OBJECTIVE STATEMENT
Receive pt in spot 28 alert and oriented x 4  s/p fall  last week,  sent by podiatrist for surgery, dx  L calcaneal fracture .  Pt c/o mild pain.  Soft cast noted.  Pt Mandarin speaking. Translation provided but .  Pt request son to translate Yair Rodarte  [phone].  REsp even and unlabored.  IV 20 G LAC placed. Labs sent

## 2022-03-31 ENCOUNTER — TRANSCRIPTION ENCOUNTER (OUTPATIENT)
Age: 67
End: 2022-03-31

## 2022-03-31 VITALS
RESPIRATION RATE: 17 BRPM | SYSTOLIC BLOOD PRESSURE: 152 MMHG | HEART RATE: 76 BPM | OXYGEN SATURATION: 96 % | TEMPERATURE: 98 F | DIASTOLIC BLOOD PRESSURE: 89 MMHG

## 2022-03-31 LAB
A1C WITH ESTIMATED AVERAGE GLUCOSE RESULT: 5.8 % — HIGH (ref 4–5.6)
ALBUMIN SERPL ELPH-MCNC: 3.8 G/DL — SIGNIFICANT CHANGE UP (ref 3.3–5)
ALP SERPL-CCNC: 104 U/L — SIGNIFICANT CHANGE UP (ref 40–120)
ALT FLD-CCNC: 17 U/L — SIGNIFICANT CHANGE UP (ref 4–41)
ANION GAP SERPL CALC-SCNC: 11 MMOL/L — SIGNIFICANT CHANGE UP (ref 7–14)
APTT BLD: 30.9 SEC — SIGNIFICANT CHANGE UP (ref 27–36.3)
AST SERPL-CCNC: 15 U/L — SIGNIFICANT CHANGE UP (ref 4–40)
BASOPHILS # BLD AUTO: 0.08 K/UL — SIGNIFICANT CHANGE UP (ref 0–0.2)
BASOPHILS NFR BLD AUTO: 0.9 % — SIGNIFICANT CHANGE UP (ref 0–2)
BILIRUB SERPL-MCNC: 0.4 MG/DL — SIGNIFICANT CHANGE UP (ref 0.2–1.2)
BUN SERPL-MCNC: 18 MG/DL — SIGNIFICANT CHANGE UP (ref 7–23)
CALCIUM SERPL-MCNC: 8.8 MG/DL — SIGNIFICANT CHANGE UP (ref 8.4–10.5)
CHLORIDE SERPL-SCNC: 104 MMOL/L — SIGNIFICANT CHANGE UP (ref 98–107)
CHOLEST SERPL-MCNC: 121 MG/DL — SIGNIFICANT CHANGE UP
CO2 SERPL-SCNC: 22 MMOL/L — SIGNIFICANT CHANGE UP (ref 22–31)
CREAT SERPL-MCNC: 0.84 MG/DL — SIGNIFICANT CHANGE UP (ref 0.5–1.3)
EGFR: 97 ML/MIN/1.73M2 — SIGNIFICANT CHANGE UP
EOSINOPHIL # BLD AUTO: 0.27 K/UL — SIGNIFICANT CHANGE UP (ref 0–0.5)
EOSINOPHIL NFR BLD AUTO: 3 % — SIGNIFICANT CHANGE UP (ref 0–6)
ESTIMATED AVERAGE GLUCOSE: 120 — SIGNIFICANT CHANGE UP
GLUCOSE SERPL-MCNC: 104 MG/DL — HIGH (ref 70–99)
HCT VFR BLD CALC: 38.2 % — LOW (ref 39–50)
HDLC SERPL-MCNC: 34 MG/DL — LOW
HGB BLD-MCNC: 12.5 G/DL — LOW (ref 13–17)
IANC: 5.86 K/UL — SIGNIFICANT CHANGE UP (ref 1.8–7.4)
IMM GRANULOCYTES NFR BLD AUTO: 0.4 % — SIGNIFICANT CHANGE UP (ref 0–1.5)
INR BLD: 1.08 RATIO — SIGNIFICANT CHANGE UP (ref 0.88–1.16)
LIPID PNL WITH DIRECT LDL SERPL: 68 MG/DL — SIGNIFICANT CHANGE UP
LYMPHOCYTES # BLD AUTO: 1.88 K/UL — SIGNIFICANT CHANGE UP (ref 1–3.3)
LYMPHOCYTES # BLD AUTO: 21.1 % — SIGNIFICANT CHANGE UP (ref 13–44)
MCHC RBC-ENTMCNC: 28.7 PG — SIGNIFICANT CHANGE UP (ref 27–34)
MCHC RBC-ENTMCNC: 32.7 GM/DL — SIGNIFICANT CHANGE UP (ref 32–36)
MCV RBC AUTO: 87.8 FL — SIGNIFICANT CHANGE UP (ref 80–100)
MONOCYTES # BLD AUTO: 0.79 K/UL — SIGNIFICANT CHANGE UP (ref 0–0.9)
MONOCYTES NFR BLD AUTO: 8.9 % — SIGNIFICANT CHANGE UP (ref 2–14)
NEUTROPHILS # BLD AUTO: 5.86 K/UL — SIGNIFICANT CHANGE UP (ref 1.8–7.4)
NEUTROPHILS NFR BLD AUTO: 65.7 % — SIGNIFICANT CHANGE UP (ref 43–77)
NON HDL CHOLESTEROL: 87 MG/DL — SIGNIFICANT CHANGE UP
NRBC # BLD: 0 /100 WBCS — SIGNIFICANT CHANGE UP
NRBC # FLD: 0 K/UL — SIGNIFICANT CHANGE UP
PLATELET # BLD AUTO: 363 K/UL — SIGNIFICANT CHANGE UP (ref 150–400)
POTASSIUM SERPL-MCNC: 4 MMOL/L — SIGNIFICANT CHANGE UP (ref 3.5–5.3)
POTASSIUM SERPL-SCNC: 4 MMOL/L — SIGNIFICANT CHANGE UP (ref 3.5–5.3)
PROT SERPL-MCNC: 7.1 G/DL — SIGNIFICANT CHANGE UP (ref 6–8.3)
PROTHROM AB SERPL-ACNC: 12.5 SEC — SIGNIFICANT CHANGE UP (ref 10.5–13.4)
RBC # BLD: 4.35 M/UL — SIGNIFICANT CHANGE UP (ref 4.2–5.8)
RBC # FLD: 13.9 % — SIGNIFICANT CHANGE UP (ref 10.3–14.5)
RH IG SCN BLD-IMP: POSITIVE — SIGNIFICANT CHANGE UP
SODIUM SERPL-SCNC: 137 MMOL/L — SIGNIFICANT CHANGE UP (ref 135–145)
TRIGL SERPL-MCNC: 96 MG/DL — SIGNIFICANT CHANGE UP
WBC # BLD: 8.92 K/UL — SIGNIFICANT CHANGE UP (ref 3.8–10.5)
WBC # FLD AUTO: 8.92 K/UL — SIGNIFICANT CHANGE UP (ref 3.8–10.5)

## 2022-03-31 PROCEDURE — 99232 SBSQ HOSP IP/OBS MODERATE 35: CPT

## 2022-03-31 RX ORDER — ACETAMINOPHEN 500 MG
2 TABLET ORAL
Qty: 0 | Refills: 0 | DISCHARGE
Start: 2022-03-31

## 2022-03-31 RX ADMIN — SODIUM CHLORIDE 75 MILLILITER(S): 9 INJECTION INTRAMUSCULAR; INTRAVENOUS; SUBCUTANEOUS at 05:08

## 2022-03-31 RX ADMIN — AMLODIPINE BESYLATE 5 MILLIGRAM(S): 2.5 TABLET ORAL at 05:10

## 2022-03-31 RX ADMIN — PANTOPRAZOLE SODIUM 40 MILLIGRAM(S): 20 TABLET, DELAYED RELEASE ORAL at 05:10

## 2022-03-31 RX ADMIN — LOSARTAN POTASSIUM 100 MILLIGRAM(S): 100 TABLET, FILM COATED ORAL at 05:10

## 2022-03-31 NOTE — DISCHARGE NOTE PROVIDER - NSDCFUADDAPPT_GEN_ALL_CORE_FT
Podiatry Discharge Instructions:  Follow up: Please follow up with Dr. Sparrow within 1 week of discharge from the hospital, please call [phone] for appointment and discuss that you recently were seen in the hospital.  Wound Care: Please leave your dressing clean dry intact until your follow up appointment  Weight bearing: Please remain non weight bearing to the Left lower extremity   Antibiotics: Please continue as instructed. Podiatry Discharge Instructions:  Follow up: Please follow up with Dr. Sparrow within 1 week of discharge from the hospital, please call [phone] for appointment and discuss that you recently were seen in the hospital.  Wound Care: Please leave your dressing clean dry intact until your follow up appointment  Weight bearing: Please remain non weight bearing to the Left lower extremity

## 2022-03-31 NOTE — PRE-OP CHECKLIST - SELECT TESTS ORDERED
Type & cross confirmatory sent/Type and Cross Type & cross confirmatory sent/Type and Cross/COVID-19

## 2022-03-31 NOTE — DISCHARGE NOTE NURSING/CASE MANAGEMENT/SOCIAL WORK - NSDCFUADDAPPT_GEN_ALL_CORE_FT
Podiatry Discharge Instructions:  Follow up: Please follow up with Dr. Sparrow within 1 week of discharge from the hospital, please call [phone] for appointment and discuss that you recently were seen in the hospital.  Wound Care: Please leave your dressing clean dry intact until your follow up appointment  Weight bearing: Please remain non weight bearing to the Left lower extremity

## 2022-03-31 NOTE — DISCHARGE NOTE NURSING/CASE MANAGEMENT/SOCIAL WORK - NSDCPEFALRISK_GEN_ALL_CORE
For information on Fall & Injury Prevention, visit: [email or web address] OR  [email or web address] OR  https://www.cdc.gov/steadi/patient.html

## 2022-03-31 NOTE — DISCHARGE NOTE PROVIDER - HOSPITAL COURSE
66 M PMHx HTN, GERD, CAD S/P PCI with NESHA 3/24 sent in podiatrist Dr. Corley for surgery tomorrow. Pt was dx with a Lt calcaneal fracture last week and is scheduled for surgery tomorrow at [facility]. Pt admits to mild pain to L foot. Denies chest pain, sob, abd pain, N/VD, numbness, tingling, weakness, dizziness, syncope, fever or chills. Cardio consulted. EKG: NSR, inferior ischemic changes consistent with prior EKG 3/24 and known history of CAD S/P PCI. Pt is HIGH risk patient being evaluated for a LOW to INTERMEDIATE risk procedure. Decision between Anesthetia and Podiatry made to postpone case for better optimization/cardiac recovery.     Spoke with attending, Dr. Greene, pt stable for discharge to home with outpatient Podiatry follow up

## 2022-03-31 NOTE — DISCHARGE NOTE PROVIDER - CARE PROVIDER_API CALL
Junior Sparrow (DPM)  Surgery  [address], [place] [address]  Phone: [phone]  Fax: [phone]  Follow Up Time:

## 2022-03-31 NOTE — PROGRESS NOTE ADULT - SUBJECTIVE AND OBJECTIVE BOX
Patient is a 66y old  Male who presents with a chief complaint of Lt calcaneal fracture (30 Mar 2022 16:58)      INTERVAL HPI/OVERNIGHT EVENTS:   Pt is scheduled for left calcaneus ORIF with Dr. Junior Sparrow at 230pm. Patient is aware of procedure and is NPO since midnight.    MEDICATIONS  (STANDING):  amLODIPine   Tablet 5 milliGRAM(s) Oral daily  aspirin enteric coated 81 milliGRAM(s) Oral daily  atorvastatin 80 milliGRAM(s) Oral at bedtime  clopidogrel Tablet 75 milliGRAM(s) Oral daily  enoxaparin Injectable 40 milliGRAM(s) SubCutaneous every 24 hours  losartan 100 milliGRAM(s) Oral daily  metoprolol succinate ER 50 milliGRAM(s) Oral daily  pantoprazole    Tablet 40 milliGRAM(s) Oral before breakfast  sodium chloride 0.9%. 1000 milliLiter(s) (75 mL/Hr) IV Continuous <Continuous>    MEDICATIONS  (PRN):  acetaminophen     Tablet .. 650 milliGRAM(s) Oral every 6 hours PRN Temp greater or equal to 38C (100.4F), Mild Pain (1 - 3)  melatonin 3 milliGRAM(s) Oral at bedtime PRN Insomnia  ondansetron Injectable 4 milliGRAM(s) IV Push every 8 hours PRN Nausea and/or Vomiting      Allergies    No Known Allergies    Intolerances        Vital Signs Last 24 Hrs  T(C): 36.6 (31 Mar 2022 05:00), Max: 36.9 (30 Mar 2022 17:50)  T(F): 97.8 (31 Mar 2022 05:00), Max: 98.5 (30 Mar 2022 17:50)  HR: 57 (31 Mar 2022 05:00) (57 - 83)  BP: 114/73 (31 Mar 2022 05:00) (114/73 - 157/82)  BP(mean): --  RR: 17 (31 Mar 2022 05:00) (16 - 18)  SpO2: 96% (31 Mar 2022 05:00) (96% - 100%)    LABS:                        12.5   8.92  )-----------( 363      ( 31 Mar 2022 05:02 )             38.2     03-31    137  |  104  |  18  ----------------------------<  104<H>  4.0   |  22  |  0.84    Ca    8.8      31 Mar 2022 05:02    TPro  7.1  /  Alb  3.8  /  TBili  0.4  /  DBili  x   /  AST  15  /  ALT  17  /  AlkPhos  104  03-31    PT/INR - ( 31 Mar 2022 05:02 )   PT: 12.5 sec;   INR: 1.08 ratio         PTT - ( 31 Mar 2022 05:02 )  PTT:30.9 sec    CAPILLARY BLOOD GLUCOSE          RADIOLOGY & ADDITIONAL TESTS:    Plan:   To OR today at 230pm with Dr. Sparrow for 230pm.   CXR on sunrise.  EKG on sunrise.  Medical/Cardiac clearance since 3/31 and documented in chart.  Consent signed and in chart.  Procedure was explained to patient in detail. All alternatives, risks and complications were discussed. All questions answered.
Patient is a 66y old  Male who presents with a chief complaint of Foot injury (31 Mar 2022 10:09)      SUBJECTIVE / OVERNIGHT EVENTS:  no acute events overnight.  denies chest pain, palpitation, sOB, visual disturbance.    MEDICATIONS  (STANDING):  amLODIPine   Tablet 5 milliGRAM(s) Oral daily  aspirin enteric coated 81 milliGRAM(s) Oral daily  atorvastatin 80 milliGRAM(s) Oral at bedtime  clopidogrel Tablet 75 milliGRAM(s) Oral daily  enoxaparin Injectable 40 milliGRAM(s) SubCutaneous every 24 hours  losartan 100 milliGRAM(s) Oral daily  metoprolol succinate ER 50 milliGRAM(s) Oral daily  pantoprazole    Tablet 40 milliGRAM(s) Oral before breakfast  sodium chloride 0.9%. 1000 milliLiter(s) (75 mL/Hr) IV Continuous <Continuous>    MEDICATIONS  (PRN):  acetaminophen     Tablet .. 650 milliGRAM(s) Oral every 6 hours PRN Temp greater or equal to 38C (100.4F), Mild Pain (1 - 3)  melatonin 3 milliGRAM(s) Oral at bedtime PRN Insomnia  ondansetron Injectable 4 milliGRAM(s) IV Push every 8 hours PRN Nausea and/or Vomiting      T(C): 36.6 (03-31-22 @ 05:00), Max: 36.9 (03-30-22 @ 17:50)  HR: 57 (03-31-22 @ 05:00) (57 - 68)  BP: 114/73 (03-31-22 @ 05:00) (114/73 - 135/75)  RR: 17 (03-31-22 @ 05:00) (16 - 18)  SpO2: 96% (03-31-22 @ 05:00) (96% - 99%)  CAPILLARY BLOOD GLUCOSE        I&O's Summary    30 Mar 2022 07:01  -  31 Mar 2022 07:00  --------------------------------------------------------  IN: 300 mL / OUT: 0 mL / NET: 300 mL        PHYSICAL EXAM:  Constitutional: NAD.   HEENT: AT/NC, EOMI, Supple neck;  Respiratory: no wheezing or crackles. no increase in WOB  Cardiovascular: RRR, S1, S2, no M/R/G. 2+ distal pulses. no JVD, no LE edema.  Gastrointestinal: soft; NT/ND, +BS  Extremities: no cyanosis; non-tender to palpation, DP and Radial pulses intact.  Neurological: A&Ox 3;  Psychiatric: normal mood/affect.      LABS:                        12.5   8.92  )-----------( 363      ( 31 Mar 2022 05:02 )             38.2     03-31    137  |  104  |  18  ----------------------------<  104<H>  4.0   |  22  |  0.84    Ca    8.8      31 Mar 2022 05:02    TPro  7.1  /  Alb  3.8  /  TBili  0.4  /  DBili  x   /  AST  15  /  ALT  17  /  AlkPhos  104  03-31    PT/INR - ( 31 Mar 2022 05:02 )   PT: 12.5 sec;   INR: 1.08 ratio         PTT - ( 31 Mar 2022 05:02 )  PTT:30.9 sec          RADIOLOGY & ADDITIONAL TESTS:    Imaging Personally Reviewed: NA    Consultant(s) Notes Reviewed:  NA    Care Discussed with Consultants/Other Providers: YVONNE  
Andre Greene MD (Internal medicine/Nephrology progress note)  [address] [facility],  [address],  [place]  TEl: [phone]  Cell: [phone]    Patient is a 66y Male seen and evaluated at bedside. Vital signs, laboratory data, imaging studies, consult notes reviewed done within past 24 hours. Overnight events noted. Patient lying in bed in no distress offers no complains of chest pain at present await for OR later today    Allergies    No Known Allergies    Intolerances        ROS:  CONSTITUTIONAL: No fever or chills.  HEENT: No headache or visual disturbances.  RESPIRATORY: No shortness of breath, cough, hemoptysis or wheezing  CARDIOVASCULAR: No Chest pain, shortness of breath, palpitations, dizziness, syncope, orthopnea, PND or leg swelling.  GASTROINTESTINAL: No abdominal pain, nausea, vomiting, diarrhea, hematemesis or blood per rectum.  GENITOURINARY: No urinary frequency, urgency, gross hematuria, dysuria, flank or supra pubic pain, difficulty passing urine.  NEUROLOGICAL: No headache, focal limb weakness, tingling or numbness or seizure like activity  SKIN: No skin rash or lesion  MUSCULOSKELETAL: Mild left foot pain    VITALS:    T(C): 36.6 (03-31-22 @ 05:00), Max: 36.9 (03-30-22 @ 17:50)  HR: 57 (03-31-22 @ 05:00) (57 - 67)  BP: 114/73 (03-31-22 @ 05:00) (114/73 - 135/75)  RR: 17 (03-31-22 @ 05:00) (16 - 18)  SpO2: 96% (03-31-22 @ 05:00) (96% - 99%)  CAPILLARY BLOOD GLUCOSE          03-30-22 @ 07:01  -  03-31-22 @ 07:00  --------------------------------------------------------  IN: 300 mL / OUT: 0 mL / NET: 300 mL      MEDICATIONS  (STANDING):  amLODIPine   Tablet 5 milliGRAM(s) Oral daily  aspirin enteric coated 81 milliGRAM(s) Oral daily  atorvastatin 80 milliGRAM(s) Oral at bedtime  clopidogrel Tablet 75 milliGRAM(s) Oral daily  enoxaparin Injectable 40 milliGRAM(s) SubCutaneous every 24 hours  losartan 100 milliGRAM(s) Oral daily  metoprolol succinate ER 50 milliGRAM(s) Oral daily  pantoprazole    Tablet 40 milliGRAM(s) Oral before breakfast  sodium chloride 0.9%. 1000 milliLiter(s) (75 mL/Hr) IV Continuous <Continuous>    MEDICATIONS  (PRN):  acetaminophen     Tablet .. 650 milliGRAM(s) Oral every 6 hours PRN Temp greater or equal to 38C (100.4F), Mild Pain (1 - 3)  melatonin 3 milliGRAM(s) Oral at bedtime PRN Insomnia  ondansetron Injectable 4 milliGRAM(s) IV Push every 8 hours PRN Nausea and/or Vomiting      PHYSICAL EXAM:  GENERAL: Alert, awake and oriented x3 in no distress  HEENT: TO, EOMI, neck supple, no JVP, no carotid bruit, oral mucosa moist and pink.  CHEST/LUNG: Bilateral clear breath sounds, no rales, no crepitations, no rhonchi, no wheezing  HEART: Regular rate and rhythm, SAPNA II/VI at LPSB, no gallops, no rub   ABDOMEN: Soft, nontender, non distended, bowel sounds present, no palpable organomegaly  : No flank or supra pubic tenderness.  EXTREMITIES: Peripheral pulses are palpable, no pedal edema  Neurology: AAOx3, no focal neurological deficit  SKIN: No rash or skin lesion  Musculoskeletal: No joint deformity or spinal tenderness.      Vascular ACCESS:     LABS:                        12.5   8.92  )-----------( 363      ( 31 Mar 2022 05:02 )             38.2     03-31    137  |  104  |  18  ----------------------------<  104<H>  4.0   |  22  |  0.84    Ca    8.8      31 Mar 2022 05:02    TPro  7.1  /  Alb  3.8  /  TBili  0.4  /  DBili  x   /  AST  15  /  ALT  17  /  AlkPhos  104  03-31    Cholesterol, Serum: 121 mg/dL (03-31 @ 05:02)    PT/INR - ( 31 Mar 2022 05:02 )   PT: 12.5 sec;   INR: 1.08 ratio         PTT - ( 31 Mar 2022 05:02 )  PTT:30.9 sec          RADIOLOGY & ADDITIONAL STUDIES:    Imaging Personally Reviewed:  [x] YES  [ ] NO    Consultant(s) Notes Reviewed:  [x] YES  [ ] NO    Care Discussed with Primary team/ Other Providers [x] YES  [ ] NO

## 2022-03-31 NOTE — CHART NOTE - NSCHARTNOTEFT_GEN_A_CORE
Pt s/p PCI with NESHA placement in left circumflex ~ 1 week ago, on DAPT, presenting for ORIF of left calcaneal fracture . Risk and Benefits discussion among Surgical and Anesthesia team initiated. Surgical Procedure not Urgent at this time, able to prolong for 1 more week as per surgery . Given recent cardiac event with NESHA placement, Pt not a candidate for Regional Anesthesia/ Neuraxial anesthesia 2/2 DAPT. Decision made to postpone case to allow for better optimization/ cardiac recovery ( Trops ~400s)  from NSTEMI-PCI. Surgical team in agreement     Guille Malone M.D  Dept of Anesthesiology   Pager- [phone]

## 2022-03-31 NOTE — PROGRESS NOTE ADULT - ATTENDING COMMENTS
66M w CAD/PCI with NESHA placement in left circumflex one week ago, on DAPT, presenting for ORIF of left calcaneal fracture. Cardiology consulted for pre-operative evaluation of cardiovascular stability.  pt with recent PCI, can not d/c dapt.   given recent PCI, pt is high risk for any procedure.   must remain on uninterrupted dapt for procedure  will follow with you.

## 2022-03-31 NOTE — DISCHARGE NOTE PROVIDER - NSDCCPCAREPLAN_GEN_ALL_CORE_FT
PRINCIPAL DISCHARGE DIAGNOSIS  Diagnosis: Calcaneal fracture  Assessment and Plan of Treatment: It was decided to hold off surgery for further cardiac recovery given recent procedure.  Please follow up with Dr. Sparrow within 1 week of discharge from the hospital, please call [phone] for appointment and discuss that you recently were seen in the hospital.      SECONDARY DISCHARGE DIAGNOSES  Diagnosis: CAD (coronary atherosclerotic disease)  Assessment and Plan of Treatment: Continue with Aspirin, Plavix and statin. Follow up outpatient PCP for further management. Monitor for chest pain, shortness of breath, palpitations.    Diagnosis: HTN (hypertension)  Assessment and Plan of Treatment: Continue blood pressure medication regimen as directed. Monitor for any visual changes, headaches or dizziness.  Monitor blood pressure regularly.  Follow up with your PCP for further management for high blood pressure.

## 2022-03-31 NOTE — DISCHARGE NOTE PROVIDER - NSDCMRMEDTOKEN_GEN_ALL_CORE_FT
amLODIPine 5 mg oral tablet: 1 tab(s) orally once a day  Aspirin Enteric Coated 81 mg oral delayed release tablet: 1 tab(s) orally once a day  atorvastatin 80 mg oral tablet: 1 tab(s) orally once a day (at bedtime)  clopidogrel 75 mg oral tablet: 1 tab(s) orally once a day  irbesartan 300 mg oral tablet: 1 tab(s) orally once a day  Metoprolol Succinate ER 50 mg oral tablet, extended release: 1 tab(s) orally once a day  omeprazole 20 mg oral delayed release capsule: 1 cap(s) orally once a day  Please admit to Dr. Andre Greene for scheduled calcaneal ORIF surgery with podiatrist (Dr. Sparrow). Please call podiatry [place] at [phone]:   Vitamin D3 50 mcg (2000 intl units) oral tablet: 1 tab(s) orally once a day   acetaminophen 325 mg oral tablet: 2 tab(s) orally every 6 hours, As needed, Temp greater or equal to 38C (100.4F), Mild Pain (1 - 3)  amLODIPine 5 mg oral tablet: 1 tab(s) orally once a day  Aspirin Enteric Coated 81 mg oral delayed release tablet: 1 tab(s) orally once a day  atorvastatin 80 mg oral tablet: 1 tab(s) orally once a day (at bedtime)  clopidogrel 75 mg oral tablet: 1 tab(s) orally once a day  irbesartan 300 mg oral tablet: 1 tab(s) orally once a day  Metoprolol Succinate ER 50 mg oral tablet, extended release: 1 tab(s) orally once a day  omeprazole 20 mg oral delayed release capsule: 1 cap(s) orally once a day  Vitamin D3 50 mcg (2000 intl units) oral tablet: 1 tab(s) orally once a day

## 2022-04-07 ENCOUNTER — NON-APPOINTMENT (OUTPATIENT)
Age: 67
End: 2022-04-07

## 2022-04-07 ENCOUNTER — APPOINTMENT (OUTPATIENT)
Dept: CARDIOLOGY | Facility: CLINIC | Age: 67
End: 2022-04-07
Payer: MEDICARE

## 2022-04-07 VITALS
DIASTOLIC BLOOD PRESSURE: 83 MMHG | OXYGEN SATURATION: 97 % | SYSTOLIC BLOOD PRESSURE: 147 MMHG | HEART RATE: 77 BPM | TEMPERATURE: 97.9 F | HEIGHT: 67 IN | BODY MASS INDEX: 20.36 KG/M2

## 2022-04-07 VITALS — WEIGHT: 130 LBS

## 2022-04-07 DIAGNOSIS — Z91.81 HISTORY OF FALLING: ICD-10-CM

## 2022-04-07 DIAGNOSIS — Z95.5 PRESENCE OF CORONARY ANGIOPLASTY IMPLANT AND GRAFT: ICD-10-CM

## 2022-04-07 PROCEDURE — 93000 ELECTROCARDIOGRAM COMPLETE: CPT

## 2022-04-07 PROCEDURE — 99214 OFFICE O/P EST MOD 30 MIN: CPT

## 2022-04-07 NOTE — PATIENT PROFILE ADULT - OVER THE PAST TWO WEEKS HAVE YOU FELT DOWN, DEPRESSED OR HOPELESS?
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) (Chronic)     -s/p TURP  -on surveillance with cysto, now on q1y per Urology           Other Visit Diagnoses     Chronic midline low back pain without sciatica        -offered xray but declined  -continue topical agents          Subjective:      Patient ID: Izabella Pena  is a 60 y o  male  HPI  TCM Call (since 3/7/2022)     Date and time call was made  4/4/2022  7:54 AM    Hospital care reviewed  Records reviewed        Patient was hospitialized at  [facility]        Date of Admission  03/27/22    Date of discharge  03/31/22    Diagnosis  Acute on chronic respiratory failure with hypoxia and hypercapnia     Disposition  Home    Were the patients medications reviewed and updated  Yes    Current Symptoms  Shortness of breath    Shortness of breath severity  Mild      TCM Call (since 3/7/2022)     Post hospital issues  None    Should patient be enrolled in anticoag monitoring? No    Scheduled for follow up?   No    Did you obtain your prescribed medications  Yes    Do you need help managing your prescriptions or medications  No    Is transportation to your appointment needed  No    I have advised the patient to call PCP with any new or worsening symptoms  Rhonda Weathers Emily Downs MA    Living Arrangements  Family members    Support System  Family; Spouse    The type of support provided  None    Do you have social support  Yes, as much as I need    Are you recieving any outpatient services  No    Are you recieving home care services  No    Are you using any community resources  No    Current waiver services  No    Have you fallen in the last 12 months  No    Interperter language line needed  No    Counseling  Patient    Counseling topics  Diagnostic results    Comments  appointment scheduled for 4/7        66yo male with GERD, COPD with pulm nodules, LBBB, BPH with LUTS s/p TURP, prostate cancer, KEVIN, HLD, osteoporosis with vitamin D def, CM here for MARCO ANTONIO  He was hospitalized at [facility] 3/27-3/31/22 for worsened SOB  He required BIPAP during his hospitalization  Also was started on IV steroids, nebs with gradual improvement back to baseline of 3L NC  Palliative Care was engaged  He was discharged on BIPAP qhs at home  He has difficulty with current mask and will be working with Pulm for a full face mask  He is sleeping on a recliner currently due to back pain  Has been applying icy hot  At rest, sp02 is 89-90% on 3L NC  He increases oxygen to 4L with exertion  He is going to remain on prednisone 20mg at baseline, he was previously on 10mg at baseline before his hospitalization  He had last cystoscopy 12/2021, denies hematuria or difficulty with urination  He is off proscar and oxybutynin      The following portions of the patient's history were reviewed and updated as appropriate: allergies, current medications, past family history, past medical history, past social history, past surgical history and problem list       Current Outpatient Medications:     albuterol (Ventolin HFA) 90 mcg/act inhaler, Inhale 2 puffs every 6 (six) hours as needed for wheezing or shortness of breath, Disp: 8 5 g, Rfl: 0    alendronate (FOSAMAX) 70 mg tablet, TAKE 1 TABLET BY MOUTH ONE TIME PER WEEK, Disp: 12 tablet, Rfl: 1    aspirin 81 mg chewable tablet, Chew 1 tablet (81 mg total) daily, Disp: 30 tablet, Rfl: 0    ergocalciferol (VITAMIN D2) 50,000 units, TAKE 1 CAPSULE (50,000 UNITS TOTAL) BY MOUTH EVERY 28 DAYS, Disp: 3 capsule, Rfl: 1    fluticasone-umeclidinium-vilanterol (TRELEGY) 100-62 5-25 MCG/INH inhaler, Inhale 1 puff daily Rinse mouth after use , Disp: 60 blister, Rfl: 11    guaiFENesin (ROBITUSSIN) 100 MG/5ML oral liquid, Take 200 mg by mouth 3 (three) times a day as needed for cough, Disp: , Rfl:     hydrocodone-chlorpheniramine polistirex (TUSSIONEX) 10-8 mg/5 mL ER suspension, Take 5 mL by mouth every 12 (twelve) hours as needed for coughMax Daily Amount: 10 mL, Disp: 120 mL, Rfl: 0    ipratropium-albuterol (DUO-NEB) 0 5-2 5 mg/3 mL nebulizer solution, Take 3 mL by nebulization 4 (four) times a day, Disp: 360 mL, Rfl: 3    metoprolol succinate (TOPROL-XL) 25 mg 24 hr tablet, Take 1 tablet (25 mg total) by mouth daily, Disp: 30 tablet, Rfl: 0    omeprazole (PriLOSEC) 20 mg delayed release capsule, TAKE 1 CAPSULE BY MOUTH EVERY DAY, Disp: 90 capsule, Rfl: 0    predniSONE 10 mg tablet, Take 2 tablets (20 mg total) by mouth daily, Disp: 60 tablet, Rfl: 5    rosuvastatin (CRESTOR) 10 MG tablet, TAKE 1 TABLET BY MOUTH EVERY DAY, Disp: 90 tablet, Rfl: 1    docusate sodium (COLACE) 100 mg capsule, Take 1 capsule (100 mg total) by mouth 2 (two) times a day for 7 days, Disp: 14 capsule, Rfl: 0    finasteride (PROSCAR) 5 mg tablet, Take 1 tablet (5 mg total) by mouth daily for 7 days, Disp: 7 tablet, Rfl: 0    neomycin-bacitracin-polymyxin b (NEOSPORIN) ointment, Apply topically 2 (two) times a day for 5 days Apply topically twice daily for 5 days to the tip of the penis or catheter as needed for catheter irritation  , Disp: 15 g, Rfl: 0    oxybutynin (DITROPAN) 5 mg tablet, Take 1 tablet (5 mg total) by mouth 2 (two) times a day for 4 days, Disp: 8 tablet, Rfl: 0    Review of Systems   Constitutional: Positive for unexpected weight change  Negative for activity change  Respiratory: Positive for chest tightness, shortness of breath and wheezing  Cardiovascular: Negative for chest pain, palpitations and leg swelling  Gastrointestinal:        Occasional heartburn   Musculoskeletal: Positive for arthralgias and back pain  Neurological: Negative for dizziness and headaches  Objective:    /72   Pulse 72   Temp (!) 97 4 °F (36 3 °C)   Ht 5' 2" (1 575 m)   Wt 55 3 kg (122 lb)   SpO2 98%   BMI 22 31 kg/m²      Physical Exam  Vitals reviewed  Constitutional:       General: He is not in acute distress  HENT:      Nose:      Comments: Wears nasal cannula     Mouth/Throat:      Pharynx: Oropharynx is clear  Comments: edentulous  Cardiovascular:      Rate and Rhythm: Normal rate and regular rhythm  Heart sounds: Normal heart sounds  Pulmonary:      Breath sounds: Wheezing (faint) present  Comments: Decreased BS  Musculoskeletal:      Right lower leg: No edema  Left lower leg: No edema  Comments: Thoracic kyphosis   Skin:     General: Skin is dry  Neurological:      Mental Status: He is alert and oriented to person, place, and time  Psychiatric:         Attention and Perception: Attention normal          Mood and Affect: Mood normal          Speech: Speech normal          Behavior: Behavior is cooperative  no

## 2022-04-07 NOTE — PHYSICAL EXAM
[Well Developed] : well developed [Well Nourished] : well nourished [No Acute Distress] : no acute distress [Normal Conjunctiva] : normal conjunctiva [Normal Venous Pressure] : normal venous pressure [No Carotid Bruit] : no carotid bruit [Normal S1, S2] : normal S1, S2 [No Murmur] : no murmur [No Rub] : no rub [No Gallop] : no gallop [Clear Lung Fields] : clear lung fields [Good Air Entry] : good air entry [No Respiratory Distress] : no respiratory distress  [Soft] : abdomen soft [Non Tender] : non-tender [No Edema] : no edema [No Cyanosis] : no cyanosis [No Rash] : no rash [No Skin Lesions] : no skin lesions [Moves all extremities] : moves all extremities [No Focal Deficits] : no focal deficits [Normal Speech] : normal speech [Alert and Oriented] : alert and oriented

## 2022-04-12 PROBLEM — Z91.81 STATUS POST FALL: Status: RESOLVED | Noted: 2022-03-28 | Resolved: 2022-04-12

## 2022-04-12 PROBLEM — Z95.5 HISTORY OF CORONARY ARTERY STENT PLACEMENT: Status: RESOLVED | Noted: 2022-04-12 | Resolved: 2022-04-12

## 2022-04-12 NOTE — DISCUSSION/SUMMARY
[Cardiomyopathy] : cardiomyopathy [Coronary Artery Disease] : coronary artery disease [Stable] : stable [Hypertension] : hypertension [Low Sodium Diet] : low sodium diet [FreeTextEntry1] : \par Currently stable from a cardiovascular standpoint. Hypertensive today. Appears euvolemic. Stable CAD (s/p prox Cx stent, RPDA ) with reduced LV systolic function (LVEF 40-45%). No ischemic or CHF symptoms. Epigastric discomfort resolved with omeprazole. Continue current medications including aspirin and clopidogrel. ECG completed today and reviewed. Will discuss with podiatry on timing of foot surgery. Smoking cessation and low salt diet advised. Follow up in 2 months.

## 2022-04-12 NOTE — HISTORY OF PRESENT ILLNESS
[FreeTextEntry1] : Doing okay. Feeling better since taking omeprazole. Denies chest pain, shortness of breath or palpitations. Foot surgery was cancelled by anesthesiologist. Has difficulty with ambulation due to calcaneal fracture.

## 2022-04-12 NOTE — CARDIOLOGY SUMMARY
[de-identified] : \par 04/07/22 - normal sinus rhythm, LVH, old inferior infarct, peaked T-waves\par  [de-identified] : \par 03/24/22 - mild MR, normal LA, mild segmental LV systolic dysfunction, grossly normal RV size and function, LVEF 40-45%\par  [de-identified] : \par 03/24/22 (PCI) - XIENCE SKYPOINT stent to pCx 99%\par 03/24/22 (CATH) - pLAD 20%, pCx 99%, mRCA 90%, oRPDA 100% with collaterals from LAD septals

## 2022-06-09 ENCOUNTER — NON-APPOINTMENT (OUTPATIENT)
Age: 67
End: 2022-06-09

## 2022-06-09 ENCOUNTER — APPOINTMENT (OUTPATIENT)
Dept: CARDIOLOGY | Facility: CLINIC | Age: 67
End: 2022-06-09
Payer: MEDICARE

## 2022-06-09 VITALS
RESPIRATION RATE: 16 BRPM | OXYGEN SATURATION: 100 % | WEIGHT: 130 LBS | BODY MASS INDEX: 20.36 KG/M2 | SYSTOLIC BLOOD PRESSURE: 126 MMHG | DIASTOLIC BLOOD PRESSURE: 76 MMHG | HEART RATE: 71 BPM | TEMPERATURE: 97.7 F

## 2022-06-09 DIAGNOSIS — F17.200 NICOTINE DEPENDENCE, UNSPECIFIED, UNCOMPLICATED: ICD-10-CM

## 2022-06-09 PROCEDURE — 99214 OFFICE O/P EST MOD 30 MIN: CPT | Mod: 25

## 2022-06-09 PROCEDURE — 93000 ELECTROCARDIOGRAM COMPLETE: CPT

## 2022-06-09 NOTE — CARDIOLOGY SUMMARY
[de-identified] : \par 06/09/22 - normal sinus rhythm, old inferior infarct, peaked T-waves, nonspecific ST abnormality\par  [de-identified] : \par 03/24/22 - mild MR, normal LA, mild segmental LV systolic dysfunction, grossly normal RV size and function, LVEF 40-45%\par  [de-identified] : \par 03/24/22 (PCI) - XIENCE SKYPOINT stent to pCx 99%\par 03/24/22 (CATH) - pLAD 20%, pCx 99%, mRCA 90%, oRPDA 100% with collaterals from LAD septals

## 2022-06-09 NOTE — PHYSICAL EXAM
[Well Developed] : well developed [Well Nourished] : well nourished [No Acute Distress] : no acute distress [Normal Conjunctiva] : normal conjunctiva [No Carotid Bruit] : no carotid bruit [Normal Venous Pressure] : normal venous pressure [Normal S1, S2] : normal S1, S2 [No Murmur] : no murmur [No Rub] : no rub [No Gallop] : no gallop [Clear Lung Fields] : clear lung fields [Good Air Entry] : good air entry [No Respiratory Distress] : no respiratory distress  [Soft] : abdomen soft [No Edema] : no edema [Non Tender] : non-tender [No Cyanosis] : no cyanosis [No Rash] : no rash [No Skin Lesions] : no skin lesions [Moves all extremities] : moves all extremities [No Focal Deficits] : no focal deficits [Normal Speech] : normal speech [Alert and Oriented] : alert and oriented [de-identified] : left leg in soft boot

## 2022-06-09 NOTE — DISCUSSION/SUMMARY
[Cardiomyopathy] : cardiomyopathy [Coronary Artery Disease] : coronary artery disease [Hypertension] : hypertension [Stable] : stable [FreeTextEntry1] : \par Currently stable from a cardiovascular standpoint. Normotensive. Appears euvolemic. Stable CAD (s/p prox Cx stent, RPDA ) with reduced LV systolic function (LVEF 40-45%). No ischemic or CHF symptoms. Patient with nicotine dependence. Will prescribe nicotine patch for smoking cessation. Will renew atorvastatin at 40 mg daily (he complains of some itching in his hands when he took 80 mg so he took his old dose of 20 mg daily). Continue current medications including aspirin and clopidogrel. ECG completed today and reviewed. Will schedule an echocardiogram to reassess his cardiac structures and function. Follow up in 3 months.

## 2022-06-09 NOTE — HISTORY OF PRESENT ILLNESS
[FreeTextEntry1] : Doing okay. Denies chest pain, shortness of breath or palpitations. Continues to smoke at least a pack daily. Also drinks alcohol as per wife. Left foot currently in a soft boot. Doesn't have pain in his foot anywhere. Anticipating weight bearing in upcoming weeks.

## 2022-06-21 ENCOUNTER — APPOINTMENT (OUTPATIENT)
Dept: CV DIAGNOSITCS | Facility: HOSPITAL | Age: 67
End: 2022-06-21

## 2022-06-21 ENCOUNTER — OUTPATIENT (OUTPATIENT)
Dept: OUTPATIENT SERVICES | Facility: HOSPITAL | Age: 67
LOS: 1 days | End: 2022-06-21
Payer: MEDICARE

## 2022-06-21 DIAGNOSIS — R07.9 CHEST PAIN, UNSPECIFIED: ICD-10-CM

## 2022-06-21 PROCEDURE — 93306 TTE W/DOPPLER COMPLETE: CPT | Mod: 26

## 2022-08-28 NOTE — H&P ADULT - TIME-BASED BILLING (NON-CRITICAL CARE)
Problem: Pain  Goal: # Acceptable pain/comfort level is achieved/maintained at rest using age and developmentally appropriate pediatric pain scale (NRS, FACES, FLACC, NPASS)  Outcome: Outcome Met, Continue evaluating goal progress toward completion  Goal: # Patient and/or caregiver verbalizes understanding of pain management  Description: Documented in Patient Education Activity  Outcome: Outcome Met, Continue evaluating goal progress toward completion     Problem: Oxygenation/Respiratory Function  Goal: # Maintain/achieve baseline respiratory status (ex: appropriate respiratory rate, nonlabored work of breathing, symmetrical chest expansion, optimal breath sounds, effective gas exchange)  Outcome: Outcome Met, Continue evaluating goal progress toward completion     Problem: Skin Integrity  Goal: # Skin integrity is maintained or improved (skin will be intact without erythema or breakdown)  Outcome: Outcome Met, Continue evaluating goal progress toward completion     Problem: Nausea/Vomiting  Goal: Maintains oral/enteral intake with decreased/no reports of nausea/vomiting  Outcome: Outcome Met, Continue evaluating goal progress toward completion  Goal: Current weight maintained or increased  Outcome: Outcome Met, Continue evaluating goal progress toward completion  Goal: Patient/family/caregiver verbalizes understanding of s/s and strategies to control nausea/vomiting  Description: Document education using the patient education activity.   Outcome: Outcome Met, Continue evaluating goal progress toward completion  Goal: # Diminished episodes of nausea and vomiting  Outcome: Outcome Met, Continue evaluating goal progress toward completion     Problem: Nutrition  Goal: Tolerates feedings  Outcome: Outcome Met, Continue evaluating goal progress toward completion  Goal: Consumes sufficient dietary intake without complications  Outcome: Outcome Met, Continue evaluating goal progress toward completion      Time-based billing (NON-critical care)

## 2022-10-12 ENCOUNTER — NON-APPOINTMENT (OUTPATIENT)
Age: 67
End: 2022-10-12

## 2022-10-12 ENCOUNTER — APPOINTMENT (OUTPATIENT)
Dept: CARDIOLOGY | Facility: CLINIC | Age: 67
End: 2022-10-12

## 2022-10-12 VITALS
HEART RATE: 75 BPM | WEIGHT: 130 LBS | SYSTOLIC BLOOD PRESSURE: 134 MMHG | DIASTOLIC BLOOD PRESSURE: 68 MMHG | BODY MASS INDEX: 20.4 KG/M2 | OXYGEN SATURATION: 97 % | HEIGHT: 67 IN

## 2022-10-12 DIAGNOSIS — Z86.79 PERSONAL HISTORY OF OTHER DISEASES OF THE CIRCULATORY SYSTEM: ICD-10-CM

## 2022-10-12 PROCEDURE — 93000 ELECTROCARDIOGRAM COMPLETE: CPT

## 2022-10-12 PROCEDURE — 99214 OFFICE O/P EST MOD 30 MIN: CPT | Mod: 25

## 2022-10-17 PROBLEM — Z86.79 HISTORY OF ABNORMAL ELECTROCARDIOGRAPHY: Status: RESOLVED | Noted: 2022-03-28 | Resolved: 2022-10-17

## 2022-10-17 NOTE — CARDIOLOGY SUMMARY
[de-identified] : \par 10/12/22 - normal sinus rhythm, old inferior infarct, nonspecific ST abnormality\par  [de-identified] : \par 06/21/22 - mild LAE, mild to moderate global LV systolic dysfunction, normal RV size and function, LVEF 45%\par 03/24/22 - mild MR, normal LA, mild segmental LV systolic dysfunction, grossly normal RV size and function, LVEF 40-45%\par  [de-identified] : \par 03/24/22 (PCI) - XIENCE SKYPOINT stent to pCx 99%\par 03/24/22 (CATH) - pLAD 20%, pCx 99%, mRCA 90%, oRPDA 100% with collaterals from LAD septals

## 2022-10-17 NOTE — DISCUSSION/SUMMARY
[Cardiomyopathy] : cardiomyopathy [Coronary Artery Disease] : coronary artery disease [Hypertension] : hypertension [Stable] : stable [FreeTextEntry1] : \par Currently stable from a cardiovascular standpoint. Normotensive. History of ischemic cardiomyopathy. Currently appears euvolemic. Stable CAD (s/p prox Cx stent, RPDA ) with reduced LV systolic function (LVEF 45%). No ischemic or CHF symptoms. Patient with nicotine dependence. Continue current medications including aspirin and clopidogrel. ECG completed today and reviewed. The importance of smoking cessation was discussed. Follow up in 3 months. [EKG obtained to assist in diagnosis and management of assessed problem(s)] : EKG obtained to assist in diagnosis and management of assessed problem(s)

## 2022-10-17 NOTE — PHYSICAL EXAM
[Well Developed] : well developed [Well Nourished] : well nourished [No Acute Distress] : no acute distress [Normal Conjunctiva] : normal conjunctiva [Normal Venous Pressure] : normal venous pressure [No Carotid Bruit] : no carotid bruit [Normal S1, S2] : normal S1, S2 [No Murmur] : no murmur [No Rub] : no rub [No Gallop] : no gallop [Clear Lung Fields] : clear lung fields [Good Air Entry] : good air entry [No Respiratory Distress] : no respiratory distress  [Soft] : abdomen soft [Non Tender] : non-tender [Normal Gait] : normal gait [No Cyanosis] : no cyanosis [No Rash] : no rash [No Skin Lesions] : no skin lesions [Moves all extremities] : moves all extremities [No Focal Deficits] : no focal deficits [Normal Speech] : normal speech [Alert and Oriented] : alert and oriented [de-identified] : left ankle trace pitting edema

## 2022-10-17 NOTE — HISTORY OF PRESENT ILLNESS
[FreeTextEntry1] : Doing okay. Walking better now. Foot is healing okay. Denies chest pain, shortness of breath or palpitations. Occasional when he is moving around quickly, he may feel some shortness of breath. Continues to smoke about a pack a day.

## 2023-01-18 ENCOUNTER — NON-APPOINTMENT (OUTPATIENT)
Age: 68
End: 2023-01-18

## 2023-01-18 ENCOUNTER — APPOINTMENT (OUTPATIENT)
Dept: CARDIOLOGY | Facility: CLINIC | Age: 68
End: 2023-01-18
Payer: MEDICARE

## 2023-01-18 VITALS
BODY MASS INDEX: 20.4 KG/M2 | HEART RATE: 75 BPM | DIASTOLIC BLOOD PRESSURE: 79 MMHG | OXYGEN SATURATION: 99 % | WEIGHT: 130 LBS | SYSTOLIC BLOOD PRESSURE: 146 MMHG | HEIGHT: 67 IN

## 2023-01-18 PROCEDURE — 99214 OFFICE O/P EST MOD 30 MIN: CPT | Mod: 25

## 2023-01-18 PROCEDURE — 93000 ELECTROCARDIOGRAM COMPLETE: CPT

## 2023-01-18 RX ORDER — NICOTINE 21 MG/24HR
14 PATCH, TRANSDERMAL 24 HOURS TRANSDERMAL DAILY
Qty: 30 | Refills: 5 | Status: DISCONTINUED | COMMUNITY
Start: 2022-06-09 | End: 2023-01-18

## 2023-01-18 NOTE — REVIEW OF SYSTEMS
[Negative] : Musculoskeletal [SOB] : no shortness of breath [Dyspnea on exertion] : not dyspnea during exertion [Chest Discomfort] : chest discomfort [Lower Ext Edema] : no extremity edema [Leg Claudication] : no intermittent leg claudication [Palpitations] : no palpitations [Orthopnea] : no orthopnea [PND] : no PND [Syncope] : no syncope [FreeTextEntry9] : see HPI

## 2023-01-18 NOTE — HISTORY OF PRESENT ILLNESS
[FreeTextEntry1] : Occasional chest discomfort with exertion. Denies shortness of breath or palpitations. Complains of occasional feeling of leg weakness.

## 2023-01-18 NOTE — CARDIOLOGY SUMMARY
[de-identified] : \par 01/18/23 - normal sinus rhythm, old inferior infarct, nonspecific ST abnormality\par  [de-identified] : \par 06/21/22 - mild LAE, mild to moderate global LV systolic dysfunction, normal RV size and function, LVEF 45%\par 03/24/22 - mild MR, normal LA, mild segmental LV systolic dysfunction, grossly normal RV size and function, LVEF 40-45%\par  [de-identified] : \par 03/24/22 (PCI) - XIENCE SKYPOINT stent to pCx 99%\par 03/24/22 (CATH) - pLAD 20%, pCx 99%, mRCA 90%, oRPDA 100% with collaterals from LAD septals

## 2023-01-18 NOTE — PHYSICAL EXAM
[Well Developed] : well developed [Well Nourished] : well nourished [No Acute Distress] : no acute distress [Normal Conjunctiva] : normal conjunctiva [Normal Venous Pressure] : normal venous pressure [No Carotid Bruit] : no carotid bruit [Normal S1, S2] : normal S1, S2 [No Murmur] : no murmur [No Rub] : no rub [No Gallop] : no gallop [Clear Lung Fields] : clear lung fields [Good Air Entry] : good air entry [No Respiratory Distress] : no respiratory distress  [Soft] : abdomen soft [Non Tender] : non-tender [Normal Gait] : normal gait [No Cyanosis] : no cyanosis [No Rash] : no rash [No Skin Lesions] : no skin lesions [Moves all extremities] : moves all extremities [No Focal Deficits] : no focal deficits [Normal Speech] : normal speech [Alert and Oriented] : alert and oriented [No Edema] : no edema

## 2023-01-18 NOTE — DISCUSSION/SUMMARY
[Cardiomyopathy] : cardiomyopathy [Coronary Artery Disease] : coronary artery disease [Hypertension] : hypertension [Possible Cardiac Ischemia (Intermd Prob)] : possible cardiac ischemia (intermediate probability) [Stable] : stable [Smoking Cessation] : smoking cessation [Low Sodium Diet] : low sodium diet [FreeTextEntry1] : \par Currently stable from a cardiovascular standpoint. Hypertensive today (had coffee and cigarette prior to office visit). History of ischemic cardiomyopathy. Currently appears euvolemic. Stable CAD (s/p prox Cx stent, RPDA ) with reduced LV systolic function (LVEF 45%). Occasional exertional chest discomfort. Consider angina. Patient with nicotine dependence. Continue current medications including aspirin and clopidogrel. ECG completed today and reviewed. The importance of smoking cessation was discussed. Will schedule an exercise nuclear stress test to rule out any significant ischemia. Pending the test results, I will make further recommendations. Follow up in 4 months. [EKG obtained to assist in diagnosis and management of assessed problem(s)] : EKG obtained to assist in diagnosis and management of assessed problem(s)

## 2023-02-07 ENCOUNTER — OUTPATIENT (OUTPATIENT)
Dept: OUTPATIENT SERVICES | Facility: HOSPITAL | Age: 68
LOS: 1 days | End: 2023-02-07

## 2023-02-07 ENCOUNTER — APPOINTMENT (OUTPATIENT)
Dept: CV DIAGNOSTICS | Facility: HOSPITAL | Age: 68
End: 2023-02-07
Payer: MEDICARE

## 2023-02-07 DIAGNOSIS — R07.89 OTHER CHEST PAIN: ICD-10-CM

## 2023-02-07 DIAGNOSIS — I25.10 ATHEROSCLEROTIC HEART DISEASE OF NATIVE CORONARY ARTERY WITHOUT ANGINA PECTORIS: ICD-10-CM

## 2023-02-07 PROCEDURE — 78452 HT MUSCLE IMAGE SPECT MULT: CPT | Mod: 26,MH

## 2023-02-07 PROCEDURE — 93018 CV STRESS TEST I&R ONLY: CPT | Mod: GC

## 2023-02-07 PROCEDURE — 93016 CV STRESS TEST SUPVJ ONLY: CPT | Mod: GC

## 2023-04-25 RX ORDER — CLOPIDOGREL BISULFATE 75 MG/1
75 TABLET, FILM COATED ORAL DAILY
Qty: 90 | Refills: 3 | Status: DISCONTINUED | COMMUNITY
Start: 1900-01-01 | End: 2023-04-25

## 2023-07-20 ENCOUNTER — RX RENEWAL (OUTPATIENT)
Age: 68
End: 2023-07-20

## 2023-07-20 RX ORDER — ATORVASTATIN CALCIUM 40 MG/1
40 TABLET, FILM COATED ORAL DAILY
Qty: 90 | Refills: 3 | Status: ACTIVE | COMMUNITY
Start: 2023-07-20 | End: 1900-01-01

## 2023-08-02 ENCOUNTER — APPOINTMENT (OUTPATIENT)
Dept: CARDIOLOGY | Facility: CLINIC | Age: 68
End: 2023-08-02
Payer: MEDICARE

## 2023-08-02 ENCOUNTER — NON-APPOINTMENT (OUTPATIENT)
Age: 68
End: 2023-08-02

## 2023-08-02 VITALS
HEIGHT: 67 IN | BODY MASS INDEX: 23.7 KG/M2 | SYSTOLIC BLOOD PRESSURE: 161 MMHG | OXYGEN SATURATION: 97 % | DIASTOLIC BLOOD PRESSURE: 87 MMHG | HEART RATE: 82 BPM | WEIGHT: 151 LBS

## 2023-08-02 DIAGNOSIS — I10 ESSENTIAL (PRIMARY) HYPERTENSION: ICD-10-CM

## 2023-08-02 DIAGNOSIS — I25.5 ISCHEMIC CARDIOMYOPATHY: ICD-10-CM

## 2023-08-02 DIAGNOSIS — I25.10 ATHEROSCLEROTIC HEART DISEASE OF NATIVE CORONARY ARTERY W/OUT ANGINA PECTORIS: ICD-10-CM

## 2023-08-02 DIAGNOSIS — R07.89 OTHER CHEST PAIN: ICD-10-CM

## 2023-08-02 PROCEDURE — 99214 OFFICE O/P EST MOD 30 MIN: CPT | Mod: 25

## 2023-08-02 PROCEDURE — 93000 ELECTROCARDIOGRAM COMPLETE: CPT

## 2023-08-02 RX ORDER — AMLODIPINE BESYLATE 5 MG/1
5 TABLET ORAL DAILY
Qty: 90 | Refills: 3 | Status: ACTIVE | COMMUNITY
Start: 1900-01-01 | End: 1900-01-01

## 2023-08-03 NOTE — CARDIOLOGY SUMMARY
[de-identified] :  08/02/23 - normal sinus rhythm, old inferior infarct, nonspecific ST abnormality  [de-identified] : \par  06/21/22 - mild LAE, mild to moderate global LV systolic dysfunction, normal RV size and function, LVEF 45%\par  03/24/22 - mild MR, normal LA, mild segmental LV systolic dysfunction, grossly normal RV size and function, LVEF 40-45%\par   [de-identified] : \par  03/24/22 (PCI) - XIENCE SKYPOINT stent to pCx 99%\par  03/24/22 (CATH) - pLAD 20%, pCx 99%, mRCA 90%, oRPDA 100% with collaterals from LAD septals

## 2023-08-03 NOTE — HISTORY OF PRESENT ILLNESS
[FreeTextEntry1] : Complains coughing alot in the mornings. Continues to smoke daily. Denies chest pain, shortness of breath or palpitations. Ran out of amlodipine and has noted BP running high.

## 2023-08-03 NOTE — REVIEW OF SYSTEMS
[Cough] : cough [Negative] : Musculoskeletal [Wheezing] : no wheezing [Coughing Up Blood] : no hemoptysis [FreeTextEntry9] : see HPI

## 2023-08-03 NOTE — DISCUSSION/SUMMARY
[Cardiomyopathy] : cardiomyopathy [Possible Cardiac Ischemia (Intermd Prob)] : possible cardiac ischemia (intermediate probability) [Coronary Artery Disease] : coronary artery disease [Stable] : stable [Hypertension] : hypertension [Smoking Cessation] : smoking cessation [Low Sodium Diet] : low sodium diet [FreeTextEntry1] : Currently stable from a cardiovascular standpoint. Hypertensive today (had ran out of amlodipine). History of ischemic cardiomyopathy. Currently appears euvolemic. Stable CAD (s/p prox Cx stent, RPDA ) with reduced LV systolic function (LVEF 45%). No ischemic or CHF symptoms. Patient with nicotine dependence. Amlodipine renewed. Continue current medications including aspirin, metoprolol and irbesartan. ECG completed today and reviewed. The importance of smoking cessation was discussed. Advised patient to follow up with PCP in regards to cough as patient likely has underlying bronchitis/COPD. Follow up in 4 months.

## 2024-07-22 ENCOUNTER — RX RENEWAL (OUTPATIENT)
Age: 69
End: 2024-07-22

## 2024-07-25 NOTE — DISCHARGE NOTE NURSING/CASE MANAGEMENT/SOCIAL WORK - PATIENT PORTAL LINK FT
You can access the FollowMyHealth Patient Portal offered by [facility] by registering at the following website: [email or web address]. By joining [facility]’s FollowMyHealth portal, you will also be able to view your health information using other applications (apps) compatible with our system. Her/She
